# Patient Record
Sex: FEMALE | Race: WHITE | NOT HISPANIC OR LATINO | Employment: FULL TIME | URBAN - METROPOLITAN AREA
[De-identification: names, ages, dates, MRNs, and addresses within clinical notes are randomized per-mention and may not be internally consistent; named-entity substitution may affect disease eponyms.]

---

## 2023-09-12 PROBLEM — M21.619 BUNION: Status: ACTIVE | Noted: 2023-09-12

## 2023-09-12 PROBLEM — M54.2 CERVICAL PAIN (NECK): Status: ACTIVE | Noted: 2023-09-12

## 2023-09-12 PROBLEM — E55.9 VITAMIN D DEFICIENCY: Status: ACTIVE | Noted: 2023-09-12

## 2023-09-12 PROBLEM — E78.5 HYPERLIPIDEMIA: Status: ACTIVE | Noted: 2023-09-12

## 2023-09-12 PROBLEM — N85.2 ENLARGED UTERUS: Status: ACTIVE | Noted: 2023-09-12

## 2023-09-12 PROBLEM — R10.2 PELVIC PAIN IN FEMALE: Status: ACTIVE | Noted: 2023-09-12

## 2023-09-12 PROBLEM — R92.8 ABNORMAL MAMMOGRAM: Status: ACTIVE | Noted: 2023-09-12

## 2023-09-12 RX ORDER — ATORVASTATIN CALCIUM 10 MG/1
1 TABLET, FILM COATED ORAL DAILY
COMMUNITY
Start: 2022-02-25

## 2023-09-12 RX ORDER — CHOLECALCIFEROL (VITAMIN D3) 25 MCG
2 TABLET ORAL DAILY
COMMUNITY
Start: 2020-02-06

## 2023-11-29 ENCOUNTER — LAB REQUISITION (OUTPATIENT)
Dept: LAB | Facility: HOSPITAL | Age: 48
End: 2023-11-29
Payer: COMMERCIAL

## 2023-11-29 DIAGNOSIS — U07.1 COVID-19: ICD-10-CM

## 2023-11-29 LAB — SARS-COV-2 RNA RESP QL NAA+PROBE: DETECTED

## 2023-11-29 PROCEDURE — 87635 SARS-COV-2 COVID-19 AMP PRB: CPT

## 2024-06-18 ENCOUNTER — OFFICE VISIT (OUTPATIENT)
Dept: OBSTETRICS AND GYNECOLOGY | Facility: CLINIC | Age: 49
End: 2024-06-18
Payer: COMMERCIAL

## 2024-06-18 VITALS
HEIGHT: 62 IN | SYSTOLIC BLOOD PRESSURE: 137 MMHG | OXYGEN SATURATION: 96 % | DIASTOLIC BLOOD PRESSURE: 90 MMHG | HEART RATE: 81 BPM | WEIGHT: 143 LBS | BODY MASS INDEX: 26.31 KG/M2

## 2024-06-18 DIAGNOSIS — N63.12 BREAST LUMP ON RIGHT SIDE AT 2 O'CLOCK POSITION: Primary | ICD-10-CM

## 2024-06-18 PROCEDURE — 99213 OFFICE O/P EST LOW 20 MIN: CPT | Performed by: NURSE PRACTITIONER

## 2024-06-18 NOTE — PROGRESS NOTES
HPI: Pete Mayo is a 48 y.o. year old  presenting for right breast mass and pain for two days.  Pt of Dr Trujillo, new to me. Perimenopausal, just finished period. Known enlarged fibroid uterus.  Pt works at pharmacy here at [email or web address]    Last mammogram: 2023 p diagnostic normal  Family history of breast cancer: none   Currently breastfeeding: no  Wearing form-fitting bra: yes  Caffeine intake: coffee 1-2 c per day  New exercise:  no  Injury to area: no   Skin changes/nipple discharge: no     Cycles are monthly, but shorter lasting for 2-3 days   LMP:  LMP 24  Irregular bleeding: no  Last pap:  pap normal, HPV neg  Other gyn history: c/s x2, known fibroid enlarged uterus   OB History    No obstetric history on file.      No past medical history on file.   Past Surgical History:  01/15/2018:  SECTION, CLASSIC      Comment:   Section   Social History    Socioeconomic History      Marital status:       Spouse name: Not on file      Number of children: Not on file      Years of education: Not on file      Highest education level: Not on file    Occupational History      Not on file    Tobacco Use      Smoking status: Not on file      Smokeless tobacco: Not on file    Substance and Sexual Activity      Alcohol use: Not on file      Drug use: Not on file      Sexual activity: Not on file    Other Topics      Concerns:        Not on file    Social History Narrative      Not on file    Social Determinants of Health  Financial Resource Strain: Not on file  Food Insecurity: Not on file  Transportation Needs: Not on file  Physical Activity: Not on file  Stress: Not on file  Social Connections: Not on file  Intimate Partner Violence: Not on file  Housing Stability: Not on file   Review of patient's family history indicates:  Problem: Hypertension      Relation: Mother          Name:               Age of Onset: (Not Specified)  Problem: Hypertension      Relation: Father          Name:         "       Age of Onset: (Not Specified)  Problem: Diabetes      Relation: Other          Name: Grandmother              Age of Onset: (Not Specified)     Current Outpatient Medications:   atorvastatin (Lipitor) 10 mg tablet, Take 1 tablet (10 mg) by mouth once daily., Disp: , Rfl:   cholecalciferol (Vitamin D-3) 25 MCG (1000 UT) tablet, Take 2 tablets (50 mcg) by mouth once daily., Disp: , Rfl:   fish oil concentrate (Omega-3) 120-180 mg capsule, Take 1 capsule (1 g) by mouth once daily., Disp: , Rfl:           REVIEW OF SYSTEMS:  Breast. + right breast mass, denies nipple discharge  : denies dysuria, hematuria incontinence   Gl: denies change in bowel habits, blood in stools      PHYSICAL EXAM:  Vitals: /90   Pulse 81   Ht 1.575 m (5' 2\")   Wt 64.9 kg (143 lb)   SpO2 96%   BMI 26.16 kg/m²   Gen: well appearing woman in NAD  HEENT: normocephalic  Breast: fibrocystic tissue bilaterally with mass noted at 2:00 right breast approx 3cmfn and approx 3.5cm round and mobile, mild tenderness, no skin changes, no lymphadenopathy; left breast with slightly thickened area in same location, but not as enlarged and prominent as right breast.   Abdomen: soft, nontender, no masses/hernias, liver and spleen not enlarged     ASSESSMENT/PLAN :    1. Breast lump on right side at 2 o'clock position  Diagnostic mammogram and US ordered  Just finished menses, has never noticed a mass this big in past  - BI mammo bilateral diagnostic tomosynthesis; Future  - BI US breast limited right; Future   "

## 2024-06-21 ENCOUNTER — HOSPITAL ENCOUNTER (OUTPATIENT)
Dept: RADIOLOGY | Facility: CLINIC | Age: 49
Discharge: HOME | End: 2024-06-21
Payer: COMMERCIAL

## 2024-06-21 VITALS — WEIGHT: 143.08 LBS | BODY MASS INDEX: 26.33 KG/M2 | HEIGHT: 62 IN

## 2024-06-21 DIAGNOSIS — N63.12 BREAST LUMP ON RIGHT SIDE AT 2 O'CLOCK POSITION: ICD-10-CM

## 2024-06-21 PROCEDURE — 76982 USE 1ST TARGET LESION: CPT | Mod: RT

## 2024-06-21 PROCEDURE — 77062 BREAST TOMOSYNTHESIS BI: CPT

## 2024-06-21 PROCEDURE — 76642 ULTRASOUND BREAST LIMITED: CPT | Mod: RT

## 2024-07-15 NOTE — PROGRESS NOTES
"[facility]  Pete Mayo female   1975 49 y.o.  [id number]      Chief Complaint  New patient, biopsy consultation.    History Of Present Illness  Pete Mayo \"Savannah\" is a very pleasant 49 y.o.  female seen in the breast center for biopsy consultation. She denies breast surgery or biopsy. She has no family history of breast cancer.     BREAST IMAGIN2024 Bilateral diagnostic mammogram and right breast ultrasound, BI-RADS Category 4. RIGHT 2:00 5 cm from the nipple, 2.4 x 1.5 x 2.0 cm, oval circumscribed parallel anechoic benign cyst with imperceptible wall, well-defined back wall and associated through transmission, no internal blood flow and soft on elastography. LEFT breast 11:00 6 cm from the nipple, 2.8 x 1.0 x 1.7 cm  (previously 2.2 x 0.6 x 1.0 cm on ultrasound 10/21/2020). interval increase in size of a left breast mass, oval hypoechoic mass with associated through transmission, lacking Doppler blood flow  and with soft elastography features, warranting ultrasound guided biopsy.    REPRODUCTIVE HISTORY: menarche age 13, , first birth age 23,  18 months, no OCP's, perimenopausal, LMP 2024, heterogeneously dense     FAMILY CANCER HISTORY:   None    Review of Systems  Constitutional:  Negative for appetite change, fatigue, fever and unexpected weight change.   HENT:  Negative for ear pain, hearing loss, nosebleeds, sore throat and trouble swallowing.    Eyes:  Negative for discharge, itching and visual disturbance.   Breast: As stated in HPI.  Respiratory:  Negative for cough, chest tightness and shortness of breath.    Cardiovascular:  Negative for chest pain, palpitations and leg swelling.   Gastrointestinal:  Negative for abdominal pain, constipation, diarrhea and nausea.   Endocrine: Negative for cold intolerance and heat intolerance.   Genitourinary:  Negative for dysuria, frequency, hematuria, pelvic pain and vaginal bleeding. "   Musculoskeletal:  Negative for arthralgias, back pain, gait problem, joint swelling and myalgias.   Skin:  Negative for color change and rash.   Allergic/Immunologic: Negative for environmental allergies and food allergies.   Neurological:  Negative for dizziness, tremors, speech difficulty, weakness, numbness and headaches.   Hematological:  Does not bruise/bleed easily.   Psychiatric/Behavioral:  Negative for agitation, dysphoric mood and sleep disturbance. The patient is not nervous/anxious.       Past Medical History  She has no past medical history on file.    Surgical History  She has a past surgical history that includes  section, classic (01/15/2018).    Family History  Cancer-related family history is not on file.     Social History  She has no history on file for tobacco use, alcohol use, and drug use.    Allergies  Patient has no known allergies.    Medications  Current Outpatient Medications   Medication Instructions    atorvastatin (Lipitor) 10 mg tablet 1 tablet, oral, Daily    cholecalciferol (Vitamin D-3) 25 MCG (1000 UT) tablet 2 tablets, oral, Daily    fish oil concentrate (Omega-3) 120-180 mg capsule 1 capsule, oral, Daily       Last Recorded Vitals  Vitals:    24 1012   BP: (!) 131/92   Pulse: 82       Physical Exam  Physical Exam  Chest:           Patient is alert and oriented x3 and in a relaxed and appropriate mood. Her gait is steady and hand grasps are equal. Sclera is clear. The breasts are nearly symmetrical. Right breast 2:00 5 cm from the nipple, 3 x 2.5 cm soft mass, consistent with cyst on imaging. Left breast 11:00 6 cm from the nipple, 3.5 x 2 cm mass like thickening. The skin and nipples appear normal. There is no cervical, supraclavicular or axillary lymphadenopathy. Heart rate and rhythm normal, S1 and S2 appreciated. The lungs are clear to auscultation bilaterally. Abdomen is soft and non-tender.      Relevant Results and Imaging  BI mammo bilateral diagnostic  tomosynthesis 06/21/2024  BI US breast limited right 06/21/2024    Narrative  Interpreted By:  Christine Tony,  STUDY:  BI MAMMO BILATERAL DIAGNOSTIC TOMOSYNTHESIS; BI US BREAST LIMITED  RIGHT;  6/21/2024 2:34 pm; 6/21/2024 3:06 pm    ACCESSION NUMBER(S):  [id number]; [id number]    ORDERING CLINICIAN:  CINDY GALARZA    INDICATION:  Tender palpable lump, 2 o'clock position right breast for 2 days.  Annual exam.    COMPARISON:  Left mammogram 05/17/2023, bilateral mammogram 04/13/2023, 02/18/2020  with additional mammographic and sonographic imaging 10/21/2020    FINDINGS:  MAMMOGRAPHY: 2D and tomosynthesis images were reviewed at 1 mm slice  thickness.    Density:  The breast tissue is heterogeneously dense, which may  obscure small masses.    At the site of palpable concern, medial superior right breast, there  is an oval circumscribed equal density mass superimposed over dense  glandular tissue. Microcalcifications and focal asymmetry in the deep  upper outer left breast remains stable. An equal density mass in the  deep medial central left breast has increased in size compared with  02/18/2020.    ULTRASOUND: Targeted ultrasound was performed of the bilateral  breasts by a registered sonographer with elastography.    Right breast: At the site of palpable concern, 2 o'clock position  right breast 5 cm from the nipple, there is an oval circumscribed  parallel anechoic benign cyst with imperceptible wall, well-defined  back wall and associated through transmission measuring 2.4 x 1.5 x  2.0 cm. There are a few smaller anechoic cysts in the surrounding  tissue. No internal Doppler blood flow and soft elastography features  are demonstrated.    Left breast: Sonographic scanning of the medial superior left breast  confirms interval increase in size of a left breast mass at the 11  o'clock position 6 cm from the nipple. There is an oval hypoechoic  mass with associated through transmission, lacking Doppler blood  flow  and with soft elastography features. This measures 2.8 x 1.0 x 1.7 cm  (previously 2.2 x 0.6 x 1.0 cm on ultrasound 10/21/2020).    Impression  Benign 2.4 cm simple cyst correlating with the site of palpable  tender lump, right breast. No mammographic evidence of malignancy  right breast.    Interval increase in size of an indeterminate left breast mass at the  11 o'clock position 6 cm from the nipple. Consultation in breast  Clinic followed by ultrasound guided breast biopsy is recommended for  definitive diagnosis. This was scheduled prior to the patient leaving  our facility.    The results were discussed with the patient. The ordering provider  was notified via the notification software.    BI-RADS CATEGORY:  BI-RADS Category:  4 Suspicious.  Recommendation:  Surgical Consultation and Biopsy.  Recommended Date:  Immediate.  Laterality:  Bilateral.          I explained the results in depth, along with suggested explanation for follow up recommendations based on the testing results. BI-RADS Category 4    Visit Diagnosis  1. Abnormal finding on breast imaging              Assessment/Plan      Plan:  Proceed to biopsy. A breast radiology physician will perform the biopsy. Results are usually available in about 7-10 business days. I will call patient with results and instruct on next steps and plan.        Patient Discussion/Summary   I recommend a left breast ultrasound guided biopsy. A breast radiology physician will perform the biopsy. Possible diagnoses include benign, atypical, or cancer as we discussed.  Bruising and mild discomfort after the biopsy is normal and will improve. I normally have results in 7-10 business days. I will call you with results, please have your phone handy to take my call.    IMPORTANT INFORMATION REGARDING YOUR RESULT    If you receive medical information from My [facility] Personal Health Record (online chart) your results will be released into your chart. This means you may view  or see results of your biopsy or procedure before I contact you directly. If this occurs, please call the office and we will discuss your results over the phone.    You can see your health information, review clinical summaries from office visits & test results online when you follow your health with MY  Chart, a personal health record. To sign up go to [email or web address]. If you need assistance with signing up or trouble getting into your account call [employer] Patient Line 24/7 at [phone].    My office phone number is [phone] if you need to get in touch with me or have additional questions or concerns. Thank you for choosing [facility] and trusting me as your healthcare provider. I look forward to seeing you again at your next office visit. I am honored to be a provider on your health care team and I remain dedicated to helping you achieve your health goals.Proceed to biopsy. A breast radiology physician will perform the biopsy. Results are usually available in about 7-10 business days. I will call patient with results and instruct on next steps and plan.       Kati Garcia, APRN-CNP

## 2024-07-24 ENCOUNTER — HOSPITAL ENCOUNTER (OUTPATIENT)
Dept: RADIOLOGY | Facility: CLINIC | Age: 49
Discharge: HOME | End: 2024-07-24
Payer: COMMERCIAL

## 2024-07-24 ENCOUNTER — PROCEDURE VISIT (OUTPATIENT)
Dept: SURGICAL ONCOLOGY | Facility: CLINIC | Age: 49
End: 2024-07-24
Payer: COMMERCIAL

## 2024-07-24 VITALS
HEART RATE: 82 BPM | BODY MASS INDEX: 26.28 KG/M2 | WEIGHT: 143.74 LBS | SYSTOLIC BLOOD PRESSURE: 131 MMHG | DIASTOLIC BLOOD PRESSURE: 92 MMHG

## 2024-07-24 DIAGNOSIS — R92.8 ABNORMAL FINDING ON BREAST IMAGING: Primary | ICD-10-CM

## 2024-07-24 DIAGNOSIS — R92.8 OTHER ABNORMAL AND INCONCLUSIVE FINDINGS ON DIAGNOSTIC IMAGING OF BREAST: ICD-10-CM

## 2024-07-24 DIAGNOSIS — N63.20 BREAST MASS, LEFT: ICD-10-CM

## 2024-07-24 PROCEDURE — 2500000005 HC RX 250 GENERAL PHARMACY W/O HCPCS: Performed by: RADIOLOGY

## 2024-07-24 PROCEDURE — 19083 BX BREAST 1ST LESION US IMAG: CPT | Mod: LT

## 2024-07-24 PROCEDURE — 2720000007 HC OR 272 NO HCPCS

## 2024-07-24 PROCEDURE — 99204 OFFICE O/P NEW MOD 45 MIN: CPT

## 2024-07-24 PROCEDURE — 99214 OFFICE O/P EST MOD 30 MIN: CPT

## 2024-07-24 PROCEDURE — 2780000003 HC OR 278 NO HCPCS

## 2024-07-24 PROCEDURE — 19083 BX BREAST 1ST LESION US IMAG: CPT | Mod: LEFT SIDE | Performed by: RADIOLOGY

## 2024-07-24 PROCEDURE — 77065 DX MAMMO INCL CAD UNI: CPT | Mod: LEFT SIDE | Performed by: RADIOLOGY

## 2024-07-24 PROCEDURE — 77065 DX MAMMO INCL CAD UNI: CPT

## 2024-07-24 PROCEDURE — A4648 IMPLANTABLE TISSUE MARKER: HCPCS

## 2024-07-24 ASSESSMENT — PAIN - FUNCTIONAL ASSESSMENT: PAIN_FUNCTIONAL_ASSESSMENT: 0-10

## 2024-07-24 ASSESSMENT — PAIN SCALES - GENERAL
PAINLEVEL_OUTOF10: 0 - NO PAIN
PAINLEVEL_OUTOF10: 0 - NO PAIN
PAINLEVEL: 0-NO PAIN
PAINLEVEL_OUTOF10: 0 - NO PAIN

## 2024-07-24 NOTE — PATIENT INSTRUCTIONS
I recommend a left breast ultrasound guided biopsy. A breast radiology physician will perform the biopsy. Possible diagnoses include benign, atypical, or cancer as we discussed.  Bruising and mild discomfort after the biopsy is normal and will improve. I normally have results in 7-10 business days. I will call you with results, please have your phone handy to take my call.    IMPORTANT INFORMATION REGARDING YOUR RESULT    If you receive medical information from My [facility] Personal Health Record (online chart) your results will be released into your chart. This means you may view or see results of your biopsy or procedure before I contact you directly. If this occurs, please call the office and we will discuss your results over the phone.    You can see your health information, review clinical summaries from office visits & test results online when you follow your health with MY  Chart, a personal health record. To sign up go to [email or web address]. If you need assistance with signing up or trouble getting into your account call [employer] Patient Line 24/7 at [phone].    My office phone number is [phone] if you need to get in touch with me or have additional questions or concerns. Thank you for choosing [facility] and trusting me as your healthcare provider. I look forward to seeing you again at your next office visit. I am honored to be a provider on your health care team and I remain dedicated to helping you achieve your health goals.Proceed to biopsy. A breast radiology physician will perform the biopsy. Results are usually available in about 7-10 business days. I will call patient with results and instruct on next steps and plan.

## 2024-07-24 NOTE — DISCHARGE INSTRUCTIONS
1040:Procedural steps explained and patient given opportunity to verbalize concerns and seek clarification.  Post procedure self-care and potential for bruising , hematoma, and pain reviewed.  Patient verbalizes understanding.      1040:Patient offered aromatherapy, warm blankets and music.   Guided imagery, touch and relaxation breathing to be used throughout the procedure.        1112: Pressure held x 10 minutes, incision dry, steri strips intact and compression dressing applied. Ice pack applied.     1125:    AFTER THE TEST  A steri-strip and bandage will be placed over the incision. You may shower after 24 hours. Remove bandage after 24 hours. Remove bandage after the shower. Leave the steri-strips in place to fall off on their own. If after 1 week the steri-strips are still on, you may remove them. Avoid swimming or soaking in tub for 3 days.     You may have mild discomfort at the test site. If needed, you may take Tylenol (Acetaminophen) for pain. Please avoid taking NSAIDs, Motrin, Advil, Aleve, or ibuprofen for 24 hours following the biopsy. After 24 hours you may resume NSAIDSs.     If you take aspirin, Plavix, Coumadin, Xarelto or Eliquis please tell us. If these medications were stopped by your provider, please ask them when to resume.     You may have some tenderness, bruising or slight bleeding at the site. Please apply ice packs to the site for 15 minutes on and 15 minutes off for a 2 hour minimum.     Most people can return to their usual routine after the procedure. Avoid Strenuous activity for 24 hours.     Sleep in a bra the night after your biopsy. Continue to do so for comfort.     Call your provider if you have any of the following symptoms :  Fever  Increased pain  Increased bleeding  Redness  Increased swelling  Yellowish drainage  Your provider will get the biopsy results within 5 - 7 days. Call your provider with any questions.     Patient education brochure and pain/comfort measures have  been reviewed.   Phone number provided to contact Breast Center if problems arise.     Patient verbalized understanding of home going instructions.     1135:  Patient discharged ambulatory

## 2024-07-29 LAB
LABORATORY COMMENT REPORT: NORMAL
PATH REPORT.FINAL DX SPEC: NORMAL
PATH REPORT.GROSS SPEC: NORMAL
PATH REPORT.TOTAL CANCER: NORMAL

## 2024-07-30 ENCOUNTER — TELEPHONE (OUTPATIENT)
Dept: SURGERY | Facility: HOSPITAL | Age: 49
End: 2024-07-30
Payer: COMMERCIAL

## 2024-07-30 NOTE — TELEPHONE ENCOUNTER
"Result Communication    Resulted Orders   Surgical Pathology Exam   Result Value Ref Range    Case Report       Surgical Pathology                                Case: [id number]                                  Authorizing Provider:  MICHELLE Caputo   Collected:           07/24/2024 1102              Ordering Location:     [facility]       Received:            07/24/2024 1529                                     Center                                                                       Pathologist:           Renée Denton MD                                                            Specimen:    BREAST CORE BIOPSY LEFT, Left Breast 11:00 6CMFN                                           FINAL DIAGNOSIS       A.  LEFT BREAST MASS, 11:00, 6 CM FROM NIPPLE, ULTRASOUND-GUIDED CORE NEEDLE BIOPSY:  --FIBROEPITHELIAL LESION WITH VARIABLE INCREASE OF STROMAL CELLULARITY, SEE NOTE    Note: Left breast mass shows fibroepithelial lesion with increased stromal cellularity and with associated sclerosing adenosis, apocrine metaplasia and cyst and pseudoangiomatous stromal hyperplasia.  The morphologic features favor fibroadenoma.  This case has been reviewed at breast intradepartmental consensus conference via Zoom meeting.              By the signature on this report, the individual or group listed as making the Final Interpretation/Diagnosis certifies that they have reviewed this case.       Gross Description       A: Received in formalin, labeled with the patient's name and hospital number and \"left breast 11:00 6 cm from nipple\", are multiple irregular/cylindrical segments of yellow-white fatty soft tissue aggregating to 2.3 x 1.2 x 0.4 cm.  The specimen is submitted in toto in 3 cassettes.  SMS    NOTE:  Ischemia time: 7/24/2024 1102.  This specimen was placed into formalin at: 7/24/2021 1106.         1:22 PM      Results were successfully communicated with the patient and they acknowledged their " understanding. Awaiting concordance report, patient aware. If concordant, return to PCP for annual mammograms.

## 2024-07-30 NOTE — TELEPHONE ENCOUNTER
"Result Communication    Resulted Orders   Surgical Pathology Exam   Result Value Ref Range    Case Report       Surgical Pathology                                Case: [id number]                                  Authorizing Provider:  MICHELLE Caputo   Collected:           07/24/2024 1102              Ordering Location:     [facility]       Received:            07/24/2024 1529                                     Center                                                                       Pathologist:           Renée Denton MD                                                            Specimen:    BREAST CORE BIOPSY LEFT, Left Breast 11:00 6CMFN                                           FINAL DIAGNOSIS       A.  LEFT BREAST MASS, 11:00, 6 CM FROM NIPPLE, ULTRASOUND-GUIDED CORE NEEDLE BIOPSY:  --FIBROEPITHELIAL LESION WITH VARIABLE INCREASE OF STROMAL CELLULARITY, SEE NOTE    Note: Left breast mass shows fibroepithelial lesion with increased stromal cellularity and with associated sclerosing adenosis, apocrine metaplasia and cyst and pseudoangiomatous stromal hyperplasia.  The morphologic features favor fibroadenoma.  This case has been reviewed at breast intradepartmental consensus conference via Zoom meeting.              By the signature on this report, the individual or group listed as making the Final Interpretation/Diagnosis certifies that they have reviewed this case.       Gross Description       A: Received in formalin, labeled with the patient's name and hospital number and \"left breast 11:00 6 cm from nipple\", are multiple irregular/cylindrical segments of yellow-white fatty soft tissue aggregating to 2.3 x 1.2 x 0.4 cm.  The specimen is submitted in toto in 3 cassettes.  SMS    NOTE:  Ischemia time: 7/24/2024 1102.  This specimen was placed into formalin at: 7/24/2021 1106.         1:19 PM      Results were successfully communicated with the patient and they acknowledged their " understanding.  Awaiting concordance report, patient aware. If concordant, return to PCP for annual mammograms.

## 2024-11-12 ENCOUNTER — APPOINTMENT (OUTPATIENT)
Dept: PRIMARY CARE | Facility: CLINIC | Age: 49
End: 2024-11-12
Payer: COMMERCIAL

## 2024-12-03 ENCOUNTER — APPOINTMENT (OUTPATIENT)
Dept: PRIMARY CARE | Facility: CLINIC | Age: 49
End: 2024-12-03
Payer: COMMERCIAL

## 2024-12-03 VITALS
HEIGHT: 62 IN | DIASTOLIC BLOOD PRESSURE: 76 MMHG | WEIGHT: 148 LBS | BODY MASS INDEX: 27.23 KG/M2 | SYSTOLIC BLOOD PRESSURE: 108 MMHG

## 2024-12-03 DIAGNOSIS — Z12.11 COLON CANCER SCREENING: ICD-10-CM

## 2024-12-03 DIAGNOSIS — M79.642 PAIN IN BOTH HANDS: ICD-10-CM

## 2024-12-03 DIAGNOSIS — M25.562 ACUTE PAIN OF LEFT KNEE: ICD-10-CM

## 2024-12-03 DIAGNOSIS — Z00.00 HEALTHCARE MAINTENANCE: Primary | ICD-10-CM

## 2024-12-03 DIAGNOSIS — M79.641 PAIN IN BOTH HANDS: ICD-10-CM

## 2024-12-03 PROCEDURE — 1036F TOBACCO NON-USER: CPT | Performed by: INTERNAL MEDICINE

## 2024-12-03 PROCEDURE — 3008F BODY MASS INDEX DOCD: CPT | Performed by: INTERNAL MEDICINE

## 2024-12-03 PROCEDURE — 99215 OFFICE O/P EST HI 40 MIN: CPT | Performed by: INTERNAL MEDICINE

## 2024-12-03 RX ORDER — CELECOXIB 100 MG/1
100 CAPSULE ORAL DAILY PRN
Qty: 30 CAPSULE | Refills: 1 | Status: SHIPPED | OUTPATIENT
Start: 2024-12-03 | End: 2025-01-02

## 2024-12-03 NOTE — PROGRESS NOTES
"Subjective   Patient ID: Pete Mayo \"Octaviano" is a 49 y.o. female who presents for Annual Exam.  [facility] hallix valux , Covid November 2021     Patient describes hand pain second third and distal joints for the last 3 months achy grade 6 out of 10 nothing makes better or worse  Left knee arthralgias for the same at a time  Some swelling and nodularity of the hands  Denies trauma paralysis paresthesias joint redness fever chills              Health Maintenance:      Colonoscopy:      Mammogram: 2024      Pelvic/Pap:      Low dose chest CT:      Aorta duplex:      Optho:      Podiatry:        Vaccines:      Refer to Epic Vaccination Log        ROS:      General: denies fever/chills/weight loss      Head: Intermittent occasional headache better at present denies HA/trauma/masses/dizziness      Eyes: denies vision change/loss of vision/blurry vision/diplopia/eye pain      Ears: denies hearing loss/tinnitus/otalgia/otorrhea      Nose: denies nasal drainage/anosmia      Throat: denies dysphagia/odynophagia      Lymphatics: denies lymph node swelling      Breast: denies masses/discharge/dimpling/skin changes      Cardiac: denies CP/palpitations/orthopnea/PND      Pulmonary: denies dyspnea/cough/wheezing      GI: denies abd pain/n/v/diarrhea/melena/hematochezia/hematemesis      : denies dysuria/hematuria/change frequency      Genital: Heavy menses the last 2 weeks denies genital discharge/lesions      Skin: denies rashes/lesions/masses      MSK: Hand pain joint pain second third digit left knee arthralgia with some nodularity swelling denies weakness/swelling/edema/gait imbalance/pain      Neuro: denies paresthesias/seizures/dysarthria      Psych: denies depression/anxiety/suicidal or homicidal ideations            Objective   /76   Ht 1.575 m (5' 2\")   Wt 67.1 kg (148 lb)   BMI 27.07 kg/m²      Physical Exam:     General: AO3, NAD     Head: atraumatic/NC     Eyes: EOMI/PERRLA. Negative APD     Ears: TM " "pearly gray, EAC clear. No lesions or erythema     Nose: symmetric nares, no discharge     Throat: trachea midline, uvula midline pink mucosa. No thyromegaly     Lymphatics: no cervical/supraclavicular/ant or posterior cervical adenopathy/axillary/inguinal adenopathy     Breast: not examined     Chest: no deformity or tenderness to palpation     Pulm: CTA b/l, no wheeze/rhonchi/rales. nonlabored     Cardiac: RRR +s1s2, no m/r/g.      GI: soft, NT/ND. Normoactive Bsx4. No rebound/guarding.     Rectal: not examined     Genital: not examined     MSK: Heberden type nodules second third DIPs bilateral left knee crepitance 5/5 strength UE LE. No edema/clubbing/cyanosis     Skin: no rashes/lesions     Vascular: 2+ palp DP PT radials b/l. Negative carotid bruit     Neuro: CNII-XII intact. No focal deficits. Reflexes 2/4 brachioradialis bicep tricep patellar achilles. Finger to nose intact.     Psych: appropriate mood/affect                    No results found for: \"BMPR1A\", \"CBCDIF\"    Patient deferred rheumatology referral  Assessment/Plan   Diagnoses and all orders for this visit:  Healthcare maintenance  -     CBC and Auto Differential; Future  -     Comprehensive Metabolic Panel; Future  -     Hemoglobin A1C; Future  -     Iron and TIBC; Future  -     Lipid Panel; Future  -     Thyroxine, Free; Future  -     Thyroid Stimulating Hormone; Future  -     PARVEZ with Reflex to MAGGY; Future  -     Rheumatoid Factor; Future  -     Citrulline Antibody, IgG; Future  Colon cancer screening  -     Cologuard® colon cancer screening; Future  Pain in both hands  Comments:  Suspect osteoarthritis less likely inflammatory type such as rheumatoid  Orders:  -     XR hand 1-2 views bilateral; Future  -     celecoxib (CeleBREX) 100 mg capsule; Take 1 capsule (100 mg) by mouth once daily as needed for mild pain (1 - 3).  Acute pain of left knee  Comments:  Suspect osteoarthritis less likely other  Orders:  -     XR knee left 1-2 views; " Future  -     celecoxib (CeleBREX) 100 mg capsule; Take 1 capsule (100 mg) by mouth once daily as needed for mild pain (1 - 3).    Call and follow-up with OB as you stated appointment Friday    Thank you for making appointment today Savannah    Please follow-up 6 months    Bijan Ballard DO, RUSSELL Ballard DO

## 2024-12-04 ENCOUNTER — LAB (OUTPATIENT)
Dept: LAB | Facility: LAB | Age: 49
End: 2024-12-04
Payer: COMMERCIAL

## 2024-12-04 DIAGNOSIS — Z00.00 HEALTHCARE MAINTENANCE: ICD-10-CM

## 2024-12-04 LAB
ALBUMIN SERPL BCP-MCNC: 4.2 G/DL (ref 3.4–5)
ALP SERPL-CCNC: 43 U/L (ref 33–110)
ALT SERPL W P-5'-P-CCNC: 18 U/L (ref 7–45)
ANION GAP SERPL CALC-SCNC: 12 MMOL/L (ref 10–20)
AST SERPL W P-5'-P-CCNC: 21 U/L (ref 9–39)
BASOPHILS # BLD AUTO: 0.05 X10*3/UL (ref 0–0.1)
BASOPHILS NFR BLD AUTO: 0.9 %
BILIRUB SERPL-MCNC: 0.5 MG/DL (ref 0–1.2)
BUN SERPL-MCNC: 17 MG/DL (ref 6–23)
CALCIUM SERPL-MCNC: 8.9 MG/DL (ref 8.6–10.6)
CCP IGG SERPL-ACNC: <1 U/ML
CHLORIDE SERPL-SCNC: 103 MMOL/L (ref 98–107)
CHOLEST SERPL-MCNC: 221 MG/DL (ref 0–199)
CHOLESTEROL/HDL RATIO: 3.4
CO2 SERPL-SCNC: 26 MMOL/L (ref 21–32)
CREAT SERPL-MCNC: 0.75 MG/DL (ref 0.5–1.05)
EGFRCR SERPLBLD CKD-EPI 2021: >90 ML/MIN/1.73M*2
EOSINOPHIL # BLD AUTO: 0.12 X10*3/UL (ref 0–0.7)
EOSINOPHIL NFR BLD AUTO: 2.1 %
ERYTHROCYTE [DISTWIDTH] IN BLOOD BY AUTOMATED COUNT: 13 % (ref 11.5–14.5)
EST. AVERAGE GLUCOSE BLD GHB EST-MCNC: 97 MG/DL
GLUCOSE SERPL-MCNC: 78 MG/DL (ref 74–99)
HBA1C MFR BLD: 5 %
HCT VFR BLD AUTO: 40.8 % (ref 36–46)
HDLC SERPL-MCNC: 65.1 MG/DL
HGB BLD-MCNC: 13.4 G/DL (ref 12–16)
IMM GRANULOCYTES # BLD AUTO: 0.01 X10*3/UL (ref 0–0.7)
IMM GRANULOCYTES NFR BLD AUTO: 0.2 % (ref 0–0.9)
IRON SATN MFR SERPL: 22 % (ref 25–45)
IRON SERPL-MCNC: 98 UG/DL (ref 35–150)
LDLC SERPL CALC-MCNC: 125 MG/DL
LYMPHOCYTES # BLD AUTO: 2.01 X10*3/UL (ref 1.2–4.8)
LYMPHOCYTES NFR BLD AUTO: 35.4 %
MCH RBC QN AUTO: 28.3 PG (ref 26–34)
MCHC RBC AUTO-ENTMCNC: 32.8 G/DL (ref 32–36)
MCV RBC AUTO: 86 FL (ref 80–100)
MONOCYTES # BLD AUTO: 0.39 X10*3/UL (ref 0.1–1)
MONOCYTES NFR BLD AUTO: 6.9 %
NEUTROPHILS # BLD AUTO: 3.1 X10*3/UL (ref 1.2–7.7)
NEUTROPHILS NFR BLD AUTO: 54.5 %
NON HDL CHOLESTEROL: 156 MG/DL (ref 0–149)
NRBC BLD-RTO: 0 /100 WBCS (ref 0–0)
PLATELET # BLD AUTO: 255 X10*3/UL (ref 150–450)
POTASSIUM SERPL-SCNC: 4 MMOL/L (ref 3.5–5.3)
PROT SERPL-MCNC: 6.6 G/DL (ref 6.4–8.2)
RBC # BLD AUTO: 4.73 X10*6/UL (ref 4–5.2)
RHEUMATOID FACT SER NEPH-ACNC: 11 IU/ML (ref 0–15)
SODIUM SERPL-SCNC: 137 MMOL/L (ref 136–145)
T4 FREE SERPL-MCNC: 1.13 NG/DL (ref 0.78–1.48)
TIBC SERPL-MCNC: 451 UG/DL (ref 240–445)
TRIGL SERPL-MCNC: 157 MG/DL (ref 0–149)
TSH SERPL-ACNC: 2.22 MIU/L (ref 0.44–3.98)
UIBC SERPL-MCNC: 353 UG/DL (ref 110–370)
VLDL: 31 MG/DL (ref 0–40)
WBC # BLD AUTO: 5.7 X10*3/UL (ref 4.4–11.3)

## 2024-12-04 PROCEDURE — 80053 COMPREHEN METABOLIC PANEL: CPT

## 2024-12-04 PROCEDURE — 84439 ASSAY OF FREE THYROXINE: CPT

## 2024-12-04 PROCEDURE — 86200 CCP ANTIBODY: CPT

## 2024-12-04 PROCEDURE — 86235 NUCLEAR ANTIGEN ANTIBODY: CPT

## 2024-12-04 PROCEDURE — 80061 LIPID PANEL: CPT

## 2024-12-04 PROCEDURE — 84443 ASSAY THYROID STIM HORMONE: CPT

## 2024-12-04 PROCEDURE — 36415 COLL VENOUS BLD VENIPUNCTURE: CPT

## 2024-12-04 PROCEDURE — 83036 HEMOGLOBIN GLYCOSYLATED A1C: CPT

## 2024-12-04 PROCEDURE — 83550 IRON BINDING TEST: CPT

## 2024-12-04 PROCEDURE — 86225 DNA ANTIBODY NATIVE: CPT

## 2024-12-04 PROCEDURE — 86038 ANTINUCLEAR ANTIBODIES: CPT

## 2024-12-04 PROCEDURE — 83540 ASSAY OF IRON: CPT

## 2024-12-04 PROCEDURE — 86431 RHEUMATOID FACTOR QUANT: CPT

## 2024-12-04 PROCEDURE — 85025 COMPLETE CBC W/AUTO DIFF WBC: CPT

## 2024-12-05 LAB
ANA PATTERN: ABNORMAL
ANA SER QL HEP2 SUBST: POSITIVE
ANA TITR SER IF: ABNORMAL {TITER}
CENTROMERE B AB SER-ACNC: 0.2 AI
CHROMATIN AB SERPL-ACNC: <0.2 AI
DSDNA AB SER-ACNC: <1 IU/ML
ENA JO1 AB SER QL IA: <0.2 AI
ENA RNP AB SER IA-ACNC: <0.2 AI
ENA SCL70 AB SER QL IA: <0.2 AI
ENA SM AB SER IA-ACNC: <0.2 AI
ENA SM+RNP AB SER QL IA: <0.2 AI
ENA SS-A AB SER IA-ACNC: <0.2 AI
ENA SS-B AB SER IA-ACNC: <0.2 AI
RIBOSOMAL P AB SER-ACNC: <0.2 AI

## 2024-12-06 ENCOUNTER — APPOINTMENT (OUTPATIENT)
Dept: OBSTETRICS AND GYNECOLOGY | Facility: CLINIC | Age: 49
End: 2024-12-06
Payer: COMMERCIAL

## 2024-12-06 ENCOUNTER — LAB (OUTPATIENT)
Dept: LAB | Facility: LAB | Age: 49
End: 2024-12-06
Payer: COMMERCIAL

## 2024-12-06 VITALS
BODY MASS INDEX: 26.5 KG/M2 | HEIGHT: 62 IN | SYSTOLIC BLOOD PRESSURE: 118 MMHG | DIASTOLIC BLOOD PRESSURE: 83 MMHG | WEIGHT: 144 LBS

## 2024-12-06 DIAGNOSIS — N92.4 PREMENOPAUSAL MENORRHAGIA: Primary | ICD-10-CM

## 2024-12-06 DIAGNOSIS — D25.9 UTERINE LEIOMYOMA, UNSPECIFIED LOCATION: ICD-10-CM

## 2024-12-06 DIAGNOSIS — N92.4 PREMENOPAUSAL MENORRHAGIA: ICD-10-CM

## 2024-12-06 LAB
B-HCG SERPL-ACNC: <2 MIU/ML
FSH SERPL-ACNC: 5.3 IU/L
PROLACTIN SERPL-MCNC: 4.1 UG/L (ref 3–20)

## 2024-12-06 PROCEDURE — 83001 ASSAY OF GONADOTROPIN (FSH): CPT

## 2024-12-06 PROCEDURE — 84146 ASSAY OF PROLACTIN: CPT

## 2024-12-06 PROCEDURE — 84702 CHORIONIC GONADOTROPIN TEST: CPT

## 2024-12-06 PROCEDURE — 1036F TOBACCO NON-USER: CPT | Performed by: OBSTETRICS & GYNECOLOGY

## 2024-12-06 PROCEDURE — 3008F BODY MASS INDEX DOCD: CPT | Performed by: OBSTETRICS & GYNECOLOGY

## 2024-12-06 PROCEDURE — 36415 COLL VENOUS BLD VENIPUNCTURE: CPT

## 2024-12-06 PROCEDURE — 99213 OFFICE O/P EST LOW 20 MIN: CPT | Performed by: OBSTETRICS & GYNECOLOGY

## 2024-12-06 NOTE — PROGRESS NOTES
"Subjective   Patient ID: Pete Mayo \"Savannah\" is a 49 y.o. female who presents for Menstrual Problem (Patient here for c/o last cycle lasted 2 weeks-very heavy, but no pain/cramping/Contraception-none/Declined chaperone).  HPI  Patient is a 49-year-old  3 para 2 whose had 2  sections.  Her last menstrual period was  she said it came a little bit late and she bled for about 2 weeks prior to that periods were very regular once a month.  That menses was quite heavy as well.  Of note she did see her internist recently has not had normal CBC.  Last normal Pap and HPV 2020 recently had benign breast biopsy.  History of pelvic ultrasound 2023 with 3 uterine fibroids.  Review of Systems    Objective   Physical Exam  Abdomen is soft and nontender without a pedal splenomegaly.  Pelvic: External genitalia normal, vagina normal rugae good tone cervix is clean uterus is mildly enlarged about 10 weeks irregular consistent with fibroids freely mobile nontender.  Assessment/Plan   Perimenopausal bleeding with fibroid uterus.  Will get basic blood work and pelvic ultrasound and proceed accordingly.  Did discuss possible need for endometrial sampling.         Seven Trujillo MD 24 10:39 AM   "

## 2024-12-10 ENCOUNTER — HOSPITAL ENCOUNTER (OUTPATIENT)
Dept: RADIOLOGY | Facility: HOSPITAL | Age: 49
Discharge: HOME | End: 2024-12-10
Payer: COMMERCIAL

## 2024-12-10 DIAGNOSIS — D25.9 UTERINE LEIOMYOMA, UNSPECIFIED LOCATION: ICD-10-CM

## 2024-12-10 DIAGNOSIS — N92.4 PREMENOPAUSAL MENORRHAGIA: ICD-10-CM

## 2024-12-16 ENCOUNTER — HOSPITAL ENCOUNTER (OUTPATIENT)
Dept: RADIOLOGY | Facility: HOSPITAL | Age: 49
Discharge: HOME | End: 2024-12-16
Payer: COMMERCIAL

## 2024-12-16 PROCEDURE — 76856 US EXAM PELVIC COMPLETE: CPT | Performed by: RADIOLOGY

## 2024-12-16 PROCEDURE — 76830 TRANSVAGINAL US NON-OB: CPT | Performed by: RADIOLOGY

## 2024-12-16 PROCEDURE — 76830 TRANSVAGINAL US NON-OB: CPT

## 2024-12-20 DIAGNOSIS — R76.8 ANA POSITIVE: ICD-10-CM

## 2024-12-20 DIAGNOSIS — E78.2 COMBINED HYPERLIPIDEMIA: Primary | ICD-10-CM

## 2024-12-20 RX ORDER — ATORVASTATIN CALCIUM 20 MG/1
20 TABLET, FILM COATED ORAL DAILY
Qty: 90 TABLET | Refills: 1 | Status: SHIPPED | OUTPATIENT
Start: 2024-12-20 | End: 2025-03-20

## 2024-12-23 ENCOUNTER — TELEPHONE (OUTPATIENT)
Dept: PRIMARY CARE | Facility: CLINIC | Age: 49
End: 2024-12-23
Payer: COMMERCIAL

## 2024-12-23 NOTE — TELEPHONE ENCOUNTER
----- Message from Bijan Ballard sent at 12/20/2024  5:35 PM EST -----  Veronique-please notify patient she has a positive PARVEZ autoimmune level given her joint pain she should follow-up with rheumatology for further evaluation of this.  High cholesterol uncontrolled at 221 goal is less than 200 start atorvastatin 20 mg a day work on a low-cholesterol diet increase exercise  Otherwise currently available blood work is stable normal    Merlyn-please register patient for MyChart    If any further questions, or would like an in office result review please schedule follow-up to be seen in the next 2 to 4 weeks thank you

## 2024-12-28 LAB — NONINV COLON CA DNA+OCC BLD SCRN STL QL: NEGATIVE

## 2025-01-04 ENCOUNTER — TELEPHONE (OUTPATIENT)
Dept: OBSTETRICS AND GYNECOLOGY | Facility: CLINIC | Age: 50
End: 2025-01-04
Payer: COMMERCIAL

## 2025-01-04 NOTE — TELEPHONE ENCOUNTER
Left message on patients voice mail that I called to discuss results. Pt does not have mychart set up. Told her to set up mychart or call office with best time to call her. Have called several times.

## 2025-01-06 ENCOUNTER — OFFICE VISIT (OUTPATIENT)
Dept: PRIMARY CARE | Facility: CLINIC | Age: 50
End: 2025-01-06
Payer: COMMERCIAL

## 2025-01-06 VITALS
WEIGHT: 150 LBS | OXYGEN SATURATION: 96 % | SYSTOLIC BLOOD PRESSURE: 145 MMHG | DIASTOLIC BLOOD PRESSURE: 92 MMHG | BODY MASS INDEX: 27.44 KG/M2 | HEART RATE: 82 BPM

## 2025-01-06 DIAGNOSIS — E78.5 HYPERLIPIDEMIA, UNSPECIFIED HYPERLIPIDEMIA TYPE: Primary | ICD-10-CM

## 2025-01-06 DIAGNOSIS — J06.9 VIRAL UPPER RESPIRATORY TRACT INFECTION: ICD-10-CM

## 2025-01-06 PROBLEM — R05.3 CHRONIC COUGH: Status: ACTIVE | Noted: 2025-01-06

## 2025-01-06 PROCEDURE — 99213 OFFICE O/P EST LOW 20 MIN: CPT | Performed by: STUDENT IN AN ORGANIZED HEALTH CARE EDUCATION/TRAINING PROGRAM

## 2025-01-06 PROCEDURE — 1036F TOBACCO NON-USER: CPT | Performed by: STUDENT IN AN ORGANIZED HEALTH CARE EDUCATION/TRAINING PROGRAM

## 2025-01-06 RX ORDER — BENZONATATE 200 MG/1
200 CAPSULE ORAL 3 TIMES DAILY PRN
Qty: 42 CAPSULE | Refills: 0 | Status: SHIPPED | OUTPATIENT
Start: 2025-01-06 | End: 2025-02-05

## 2025-01-06 RX ORDER — AZITHROMYCIN 250 MG/1
TABLET, FILM COATED ORAL
Qty: 6 TABLET | Refills: 0 | Status: SHIPPED | OUTPATIENT
Start: 2025-01-06 | End: 2025-01-11

## 2025-01-06 ASSESSMENT — ENCOUNTER SYMPTOMS
FATIGUE: 1
MUSCULOSKELETAL NEGATIVE: 1
GASTROINTESTINAL NEGATIVE: 1
CARDIOVASCULAR NEGATIVE: 1
NEUROLOGICAL NEGATIVE: 1
PSYCHIATRIC NEGATIVE: 1
COUGH: 1
STRIDOR: 1

## 2025-01-06 NOTE — PROGRESS NOTES
Subjective   Patient ID: Savannah Mayo is a 49 y.o. female.    Patient seen on sick visit.  Work notes that she is overall doing well however recently developed a cough.  States that is nonproductive in nature however persistent.  Otherwise, states no fever chills nausea vomiting.  Symptoms have been going on greater than 6 days.  Otherwise states no additional issues.        Review of Systems   Constitutional:  Positive for fatigue.   Respiratory:  Positive for cough and stridor.    Cardiovascular: Negative.    Gastrointestinal: Negative.    Musculoskeletal: Negative.    Neurological: Negative.    Psychiatric/Behavioral: Negative.         Objective Visit Vitals  BP (!) 145/92   Pulse 82   Wt 68 kg (150 lb)   SpO2 96%   BMI 27.44 kg/m²   OB Status Having periods   Smoking Status Never   BSA 1.72 m²      Physical Exam  Constitutional:       General: She is not in acute distress.     Appearance: She is not ill-appearing.   Eyes:      Pupils: Pupils are equal, round, and reactive to light.   Cardiovascular:      Rate and Rhythm: Normal rate and regular rhythm.      Pulses: Normal pulses.      Heart sounds: No murmur heard.  Pulmonary:      Effort: No respiratory distress.      Breath sounds: No wheezing.      Comments: No wheezing noted  Abdominal:      General: Abdomen is flat. Bowel sounds are normal. There is no distension.   Musculoskeletal:      Right lower leg: No edema.      Left lower leg: No edema.   Skin:     General: Skin is warm and dry.   Neurological:      Mental Status: She is alert. Mental status is at baseline.      Cranial Nerves: No cranial nerve deficit.      Motor: No weakness.   Psychiatric:         Mood and Affect: Mood normal.         Behavior: Behavior normal.         Assessment/Plan   Diagnoses and all orders for this visit:  Hyperlipidemia, unspecified hyperlipidemia type  Viral upper respiratory tract infection  -     azithromycin (Zithromax) 250 mg tablet; Take 2 tablets (500 mg) by mouth  once daily for 1 day, THEN 1 tablet (250 mg) once daily for 4 days. Take 2 tabs (500 mg) by mouth today, than 1 daily for 4 days..  -     benzonatate (Tessalon) 200 mg capsule; Take 1 capsule (200 mg) by mouth 3 times a day as needed for cough. Do not crush or chew.  Patient seen on sick visit    #URI  Suspect postviral syndrome recommend Z-Dontae as well as Tessalon Perles and over-the-counter medications that he honey, continue supportive care patient agreeable with this plan.  She will call if symptoms worsen or do not improve    Return to care as previous scheduled or as needed

## 2025-01-14 ENCOUNTER — APPOINTMENT (OUTPATIENT)
Dept: OBSTETRICS AND GYNECOLOGY | Facility: CLINIC | Age: 50
End: 2025-01-14
Payer: COMMERCIAL

## 2025-01-17 ENCOUNTER — TELEPHONE (OUTPATIENT)
Dept: PRIMARY CARE | Facility: CLINIC | Age: 50
End: 2025-01-17
Payer: COMMERCIAL

## 2025-01-17 NOTE — TELEPHONE ENCOUNTER
----- Message from Bijan Ballard sent at 1/13/2025 12:18 PM EST -----  Veronique-please notify patient Cologuard test normal    Merlyn-please register patient for MyChart    If any further questions, or would like an in office result review please schedule follow-up to be seen in the next 2 to 4 weeks thank you

## 2025-01-21 ENCOUNTER — APPOINTMENT (OUTPATIENT)
Dept: OBSTETRICS AND GYNECOLOGY | Facility: CLINIC | Age: 50
End: 2025-01-21
Payer: COMMERCIAL

## 2025-01-21 VITALS
SYSTOLIC BLOOD PRESSURE: 118 MMHG | BODY MASS INDEX: 26.41 KG/M2 | DIASTOLIC BLOOD PRESSURE: 80 MMHG | HEIGHT: 62 IN | WEIGHT: 143.5 LBS

## 2025-01-21 DIAGNOSIS — N93.9 ABNORMAL UTERINE BLEEDING (AUB): Primary | ICD-10-CM

## 2025-01-21 PROCEDURE — 99212 OFFICE O/P EST SF 10 MIN: CPT | Performed by: OBSTETRICS & GYNECOLOGY

## 2025-01-21 NOTE — PROGRESS NOTES
"Subjective   Patient ID: Pete Mayo \"Savannah\" is a 49 y.o. female who presents for Procedure (Patient here for emb for fibroids and menorrhagia/Pt is bleeding today/Declined chaperone).  HPI  Patient is a 49-year-old  3 para 2 whose had 2  sections.  Patient states she has been bleeding continuously every day.  Like since 2024.  Had recent ultrasound which revealed 3 uterine fibroids and endometrial thickness of 1.3 cm.  Patient and I discussed endometrial biopsy today.  Patient unable to give urine sample and would like to have biopsy another day.  Review of Systems  Deferred  Objective   Physical Exam    Assessment/Plan   Abnormal uterine bleeding, fibroid uterus with increased endometrial thickness, perimenopause.  Patient will reschedule endometrial biopsy         Seven Trujillo MD 25 1:30 PM   "

## 2025-01-27 ENCOUNTER — APPOINTMENT (OUTPATIENT)
Dept: OBSTETRICS AND GYNECOLOGY | Facility: CLINIC | Age: 50
End: 2025-01-27
Payer: COMMERCIAL

## 2025-01-27 VITALS
SYSTOLIC BLOOD PRESSURE: 128 MMHG | DIASTOLIC BLOOD PRESSURE: 87 MMHG | WEIGHT: 144.9 LBS | HEIGHT: 62 IN | BODY MASS INDEX: 26.67 KG/M2

## 2025-01-27 DIAGNOSIS — N93.9 ABNORMAL UTERINE BLEEDING: ICD-10-CM

## 2025-01-27 DIAGNOSIS — D25.9 UTERINE LEIOMYOMA, UNSPECIFIED LOCATION: Primary | ICD-10-CM

## 2025-01-27 LAB — PREGNANCY TEST URINE, POC: NEGATIVE

## 2025-01-27 PROCEDURE — 81025 URINE PREGNANCY TEST: CPT | Performed by: OBSTETRICS & GYNECOLOGY

## 2025-01-27 PROCEDURE — 58100 BIOPSY OF UTERUS LINING: CPT | Performed by: OBSTETRICS & GYNECOLOGY

## 2025-01-27 NOTE — PROGRESS NOTES
"Patient ID: Pete Mayo \"Octaviano" is a 49 y.o. female.    Endometrial biopsy    Date/Time: 1/27/2025 3:50 PM    Performed by: Seven Trujillo MD  Authorized by: Seven Trujillo MD    Consent:     Consent obtained: written    Consent given by: patient    Risks discussed: bleeding, infection and pain    Alternatives discussed: alternative treatment    Patient agrees, verbalizes understanding, and wants to proceed: yes      Procedure explained and questions answered to patient or proxy's satisfaction: yes    Indications:     Indications: abnormal uterine bleeding    Pre-procedure:     Urine pregnancy test: negative    Procedure:     A bimanual exam was performed: yes      Prepped with: Betadine    Tenaculum used: no      Cervix dilated: no      Number of passes: 0  Comments:     Procedure comments: Speculum was placed in the vagina.  It was very difficult to see the cervical os due to irregularity from fibroids.  It appeared as though there may have been a polyp protruding or fibroid protruding from cervical os.  After several attempts difficult to see the actual os.  At this point speculum was removed.  Explained to patient that I was unable to do endometrial biopsy today.  Recommend that we do procedure as outpatient hysteroscopy D&C and MyoSure.  It would be much easier to manipulate cervix and retract down when patient is asleep.  She agrees.  Will schedule surgery and proceed accordingly.    "

## 2025-01-30 ENCOUNTER — PREP FOR PROCEDURE (OUTPATIENT)
Dept: OBSTETRICS AND GYNECOLOGY | Facility: CLINIC | Age: 50
End: 2025-01-30
Payer: COMMERCIAL

## 2025-01-30 DIAGNOSIS — N93.9 ABNORMAL UTERINE BLEEDING: Primary | ICD-10-CM

## 2025-02-11 ENCOUNTER — PRE-ADMISSION TESTING (OUTPATIENT)
Dept: PREADMISSION TESTING | Facility: HOSPITAL | Age: 50
End: 2025-02-11
Payer: COMMERCIAL

## 2025-02-11 VITALS
OXYGEN SATURATION: 99 % | TEMPERATURE: 97 F | BODY MASS INDEX: 26.78 KG/M2 | HEART RATE: 78 BPM | DIASTOLIC BLOOD PRESSURE: 80 MMHG | SYSTOLIC BLOOD PRESSURE: 128 MMHG | RESPIRATION RATE: 16 BRPM | HEIGHT: 62 IN | WEIGHT: 145.5 LBS

## 2025-02-11 DIAGNOSIS — N93.9 ABNORMAL UTERINE BLEEDING (AUB): ICD-10-CM

## 2025-02-11 DIAGNOSIS — Z01.818 ENCOUNTER FOR PREADMISSION TESTING: Primary | ICD-10-CM

## 2025-02-11 DIAGNOSIS — E78.49 OTHER HYPERLIPIDEMIA: ICD-10-CM

## 2025-02-11 PROCEDURE — 99203 OFFICE O/P NEW LOW 30 MIN: CPT | Performed by: NURSE PRACTITIONER

## 2025-02-11 ASSESSMENT — DUKE ACTIVITY SCORE INDEX (DASI)
TOTAL_SCORE: 46.2
CAN YOU DO LIGHT WORK AROUND THE HOUSE LIKE DUSTING OR WASHING DISHES: YES
CAN YOU WALK A BLOCK OR TWO ON LEVEL GROUND: YES
CAN YOU TAKE CARE OF YOURSELF (EAT, DRESS, BATHE, OR USE TOILET): YES
DASI METS SCORE: 8.4
CAN YOU WALK INDOORS, SUCH AS AROUND YOUR HOUSE: YES
CAN YOU DO MODERATE WORK AROUND THE HOUSE LIKE VACUUMING, SWEEPING FLOORS OR CARRYING GROCERIES: YES
CAN YOU RUN A SHORT DISTANCE: YES
CAN YOU DO YARD WORK LIKE RAKING LEAVES, WEEDING OR PUSHING A MOWER: NO
CAN YOU DO HEAVY WORK AROUND THE HOUSE LIKE SCRUBBING FLOORS OR LIFTING AND MOVING HEAVY FURNITURE: YES
CAN YOU PARTICIPATE IN MODERATE RECREATIONAL ACTIVITIES LIKE GOLF, BOWLING, DANCING, DOUBLES TENNIS OR THROWING A BASEBALL OR FOOTBALL: YES
CAN YOU CLIMB A FLIGHT OF STAIRS OR WALK UP A HILL: YES
CAN YOU PARTICIPATE IN STRENOUS SPORTS LIKE SWIMMING, SINGLES TENNIS, FOOTBALL, BASKETBALL, OR SKIING: NO
CAN YOU HAVE SEXUAL RELATIONS: YES

## 2025-02-11 NOTE — H&P (VIEW-ONLY)
"CPM/PAT Evaluation       Name: Pete Mayo (Pete Mayo \"Savannah\")  /Age: 1975/49 y.o.     In-Person       Chief Complaint: PAT for planned GYN procedure    49 yr old female w/PHx of HLD, arthritis and currently abnormal uterine bleeding referred to PAT for planned Hysteroscopy/D&C w/myosure w/Dr Trujillo on 2025      Patient reports recent URI treated w/antibiotics, adding has since recovered well with symptoms resolved.  States since has bee feeling overall well, denies fever or cough. Reports remaining otherwise physically active, walking routinely; denies cardiac or respiratory symptoms. Past surgical hx includes  w/nausea afterwards; no previous general anesthesia.      Followed by PCP (Bijan Ballard DO (PCP) and recent visit w/Luis Carlos Beatty DO (PCP) on 2025 for suspected URI and was treated w/Z-jose g        Past Medical History:   Diagnosis Date    Dysfunctional uterine bleeding     Fibroid     PONV (postoperative nausea and vomiting)     Vision loss        Past Surgical History:   Procedure Laterality Date     SECTION, CLASSIC  01/15/2018     Section       Patient Sexual activity questions deferred to the physician.    Family History   Problem Relation Name Age of Onset    Hypertension Mother      Fibroids Mother      Hypertension Father      Diabetes Other Grandmother        No Known Allergies    Prior to Admission medications    Medication Sig Start Date End Date Taking? Authorizing Provider   cholecalciferol (Vitamin D-3) 25 MCG (1000 UT) tablet Take 2 tablets (50 mcg) by mouth once daily. 20  Yes Historical Provider, MD   fish oil concentrate (Omega-3) 120-180 mg capsule Take 1 capsule (1 g) by mouth once daily. 20  Yes Historical Provider, MD   atorvastatin (Lipitor) 20 mg tablet Take 1 tablet (20 mg) by mouth once daily.  Patient not taking: Reported on 2025 12/20/24 3/20/25  Bijan Ballard DO   benzonatate (Tessalon) 200 mg capsule Take 1 " "capsule (200 mg) by mouth 3 times a day as needed for cough. Do not crush or chew. 1/6/25 2/5/25  Luis Carlos Beatty,         Review of Systems    Constitutional: no fever, no chills and not feeling poorly.   Eyes: no eyesight problems.   ENT: no hearing loss, no nosebleeds and no sore throat.   Cardiovascular: no chest pain, no palpitations and no extremity edema.   Respiratory: no sob, no wheezing, no cough and no sob w/exertion.   Gastrointestinal: negative for abdominal pain, blood in stools or changes in bowel habits   Genitourinary: negative for dysuria, incontinence or changes in urinary habits   Musculoskeletal: no arthralgias, ambulates independently.   Integumentary: negative for lesions, rash or itching.   Neurological: negative for confusion, dizziness or fainting.   Psychiatric: not suicidal, no anxiety and no depression.   All other systems have been reviewed and are negative for complaint.     Physical Exam  Vitals reviewed.   Constitutional:       Appearance: Normal appearance.   HENT:      Head: Normocephalic.      Mouth/Throat:      Mouth: Mucous membranes are moist.   Eyes:      Pupils: Pupils are equal, round, and reactive to light.   Cardiovascular:      Rate and Rhythm: Normal rate and regular rhythm.   Pulmonary:      Effort: Pulmonary effort is normal.      Breath sounds: Normal breath sounds.   Abdominal:      General: Bowel sounds are normal.   Musculoskeletal:         General: Normal range of motion.   Skin:     General: Skin is warm and dry.   Neurological:      Mental Status: She is alert and oriented to person, place, and time.   Psychiatric:         Mood and Affect: Mood normal.          PAT AIRWAY:   Airway:     Mallampati::  I    TM distance::  >3 FB    Neck ROM::  Full  normal        Testing/Diagnostic: CBC, BMP, thyroid    Patient Specialist/PCP: Bijan Ballard DO (PCP) 12/3/2024    Visit Vitals  /80   Pulse 78   Temp 36.1 °C (97 °F) (Temporal)   Resp 16   Ht 1.575 m (5' 2\") "   Wt 66 kg (145 lb 8.1 oz)   LMP 01/21/2025   SpO2 99%   BMI 26.61 kg/m²   OB Status Having periods   Smoking Status Never   BSA 1.7 m²       DASI Risk Score      Flowsheet Row Pre-Admission Testing [facility]/11/2025 in [facility]   Can you take care of yourself (eat, dress, bathe, or use toilet)?  2.75 filed at 02/11/2025 1130   Can you walk indoors, such as around your house? 1.75 filed at 02/11/2025 1130   Can you walk a block or two on level ground?  2.75 filed at 02/11/2025 1130   Can you climb a flight of stairs or walk up a hill? 5.5 filed at 02/11/2025 1130   Can you run a short distance? 8 filed at 02/11/2025 1130   Can you do light work around the house like dusting or washing dishes? 2.7 filed at 02/11/2025 1130   Can you do moderate work around the house like vacuuming, sweeping floors or carrying groceries? 3.5 filed at 02/11/2025 1130   Can you do heavy work around the house like scrubbing floors or lifting and moving heavy furniture?  8 filed at 02/11/2025 1130   Can you do yard work like raking leaves, weeding or pushing a mower? 0 filed at 02/11/2025 1130   Can you have sexual relations? 5.25 filed at 02/11/2025 1130   Can you participate in moderate recreational activities like golf, bowling, dancing, doubles tennis or throwing a baseball or football? 6 filed at 02/11/2025 1130   Can you participate in strenous sports like swimming, singles tennis, football, basketball, or skiing? 0 filed at 02/11/2025 1130   DASI SCORE 46.2 filed at 02/11/2025 1130   METS Score (Will be calculated only when all the questions are answered) 8.4 filed at 02/11/2025 1130          Caprini DVT Assessment    No data to display       Modified Frailty Index    No data to display       CHADS2 Stroke Risk  Current as of 55 minutes ago        N/A 3 to 100%: High Risk   2 to < 3%: Medium Risk   0 to < 2%: Low Risk     Last Change: N/A          This score determines the patient's risk of having a stroke if the patient  has atrial fibrillation.        This score is not applicable to this patient. Components are not calculated.          Revised Cardiac Risk Index    No data to display       Apfel Simplified Score    No data to display       Risk Analysis Index Results This Encounter    No data found in the last 10 encounters.       Stop Bang Score      Flowsheet Row Pre-Admission Testing from 2/11/2025 in [facility]   Do you snore loudly? 0 filed at 02/11/2025 1129   Do you often feel tired or fatigued after your sleep? 0 filed at 02/11/2025 1129   Has anyone ever observed you stop breathing in your sleep? 0 filed at 02/11/2025 1129   Do you have or are you being treated for high blood pressure? 0 filed at 02/11/2025 1129   Recent BMI (Calculated) 26.6 filed at 02/11/2025 1129   Is BMI greater than 35 kg/m2? 0=No filed at 02/11/2025 1129   Age older than 50 years old? 0=No filed at 02/11/2025 1129   Is your neck circumference greater than 17 inches (Male) or 16 inches (Female)? 0 filed at 02/11/2025 1129   Gender - Male 0=No filed at 02/11/2025 1129   STOP-BANG Total Score 0 filed at 02/11/2025 1129          Prodigy: High Risk  Total Score: 0          ARISCAT Score for Postoperative Pulmonary Complications      Flowsheet Row Pre-Admission Testing from 2/11/2025 in [facility]   Age Calculated Score 3 filed at 02/11/2025 1328   Preoperative SpO2 0 filed at 02/11/2025 1328   Respiratory infection in the last month Either upper or lower (i.e., URI, bronchitis, pneumonia), with fever and antibiotic treatment 17 filed at 02/11/2025 1328   Preoperative anemia (Hgb less than 10 g/dl) 0 filed at 02/11/2025 1328   Surgical incision  0  [GYN] filed at 02/11/2025 1328   Duration of surgery  0 filed at 02/11/2025 1328   Emergency Procedure  0 filed at 02/11/2025 1328   ARISCAT Total Score  20 filed at 02/11/2025 1328          Loc Perioperative Risk for Myocardial Infarction or Cardiac Arrest (RASHIDA)      Flowsheet Row  Pre-Admission Testing [facility]/11/2025 in [facility]   Calculated Age Score 1 filed at 02/11/2025 1328   Functional Status  0 filed at 02/11/2025 1328   ASA Class  -3.29 filed at 02/11/2025 1328   Creatinine 0 filed at 02/11/2025 1328   Type of Procedure  0.76 filed at 02/11/2025 1328   RASHIDA Total Score  -6.78 filed at 02/11/2025 1328   RASHIDA % 0.11 filed at 02/11/2025 1328            Assessment and Plan:     # HLD - continue atorvastatin  # RA - suspected per PCP note; patient declined RA referral; followed by PCP  # Abnormal uterine bleeding - Hysteroscopy/D&C w/myosure w/Dr Trujillo on 2/17/2025    Medical hx, Allergies, VS and Labs reviewed  Medications addressed w/pre-op instructions provided

## 2025-02-11 NOTE — PREPROCEDURE INSTRUCTIONS
Medication List            Accurate as of February 11, 2025 11:50 AM. Always use your most recent med list.                atorvastatin 20 mg tablet  Commonly known as: Lipitor  Take 1 tablet (20 mg) by mouth once daily.  Medication Adjustments for Surgery: Take/Use as prescribed     cholecalciferol 25 MCG (1000 UT) tablet  Commonly known as: Vitamin D-3  Additional Medication Adjustments for Surgery: Take last dose 7 days before surgery     fish oil concentrate 120-180 mg capsule  Commonly known as: Omega-3  Additional Medication Adjustments for Surgery: Take last dose 7 days before surgery            PRE-OPERATIVE INSTRUCTIONS FOR SURGERY    *Do not eat anything after midnight the night of surgery.  This includes food of any kind (including hard candy, cough drops, mints).   You may have up to 13 ounces of clear liquid  until TWO hours prior to your scheduled surgery time,  Clear liquids include water, black tea/coffee, (no milk or cream) apple juice and electrolyte drinks (GATORADE).  You may chew gum until TWO hours prior you your surgery/procedure.           *One of our staff members will call you ONE business day before your surgery, between 11am-2 pm to let you know the time to arrive.    If you have not received a call by 2 pm, call [phone]    *When you arrive at the hospital-->GO TO Registration on the ground floor    *Stop smoking 24 hours prior to surgery.      No Marijuana, CBD Oil or Vaping for 48 hours    *No alcohol 24 hours prior to surgery    *You will need a responsible adult to drive you home    -No acrylic nails or nail polish on at least one fingernail, NO polish on toes for foot surgery    -You may be asked to remove your dentures, partial plate, eyeglasses or contact lenses before going to surgery.  Please bring a case for these items.    Please shower the morning of surgery so as to remove any body residue from applied products.  DO NOT REAPPLY any lotions, creams, powders, perfume,  makeup or deodorant to your body after showering.    -Body piercings need to be removed.  Jewelry and valuables should be left at home.    -Put on loose,  comfortable, clean clothing, that will accommodate bandages      What you may be asked to bring to surgery:    ___PHOTO ID and insurance information    ___Urine specimen            NPO Instructions:    Do not eat any food after midnight the night before your surgery/procedure.  You may have clear liquids until TWO hours before surgery/procedure. This includes water, black tea/coffee, (no milk or cream) apple juice and electrolyte drinks (Gatorade).  You may chew gum up to TWO hours before your surgery/procedure.      Additional Instructions:       Day of Surgery:  You may have clear liquids until TWO hours before surgery/procedure.  This includes water, black tea/coffee, (no milk or cream) apple juice and electrolyte drinks (Gatorade)  You may chew gum up to TWO hours before your surgery/procedure  Wear  comfortable loose fitting clothing  Do not use moisturizers, creams, lotions or perfume  All jewelry and valuables should be left at home

## 2025-02-11 NOTE — CPM/PAT H&P
"CPM/PAT Evaluation       Name: Pete Mayo (Pete Mayo \"Savannah\")  /Age: 1975/49 y.o.     In-Person       Chief Complaint: PAT for planned GYN procedure    49 yr old female w/PHx of HLD, arthritis and currently abnormal uterine bleeding referred to PAT for planned Hysteroscopy/D&C w/myosure w/Dr Trujillo on 2025      Patient reports recent URI treated w/antibiotics, adding has since recovered well with symptoms resolved.  States since has bee feeling overall well, denies fever or cough. Reports remaining otherwise physically active, walking routinely; denies cardiac or respiratory symptoms. Past surgical hx includes  w/nausea afterwards; no previous general anesthesia.      Followed by PCP (Bijan Ballard DO (PCP) and recent visit w/Luis Carlos Beatty DO (PCP) on 2025 for suspected URI and was treated w/Z-jose g        Past Medical History:   Diagnosis Date    Dysfunctional uterine bleeding     Fibroid     PONV (postoperative nausea and vomiting)     Vision loss        Past Surgical History:   Procedure Laterality Date     SECTION, CLASSIC  01/15/2018     Section       Patient Sexual activity questions deferred to the physician.    Family History   Problem Relation Name Age of Onset    Hypertension Mother      Fibroids Mother      Hypertension Father      Diabetes Other Grandmother        No Known Allergies    Prior to Admission medications    Medication Sig Start Date End Date Taking? Authorizing Provider   cholecalciferol (Vitamin D-3) 25 MCG (1000 UT) tablet Take 2 tablets (50 mcg) by mouth once daily. 20  Yes Historical Provider, MD   fish oil concentrate (Omega-3) 120-180 mg capsule Take 1 capsule (1 g) by mouth once daily. 20  Yes Historical Provider, MD   atorvastatin (Lipitor) 20 mg tablet Take 1 tablet (20 mg) by mouth once daily.  Patient not taking: Reported on 2025 12/20/24 3/20/25  Bijan Ballard DO   benzonatate (Tessalon) 200 mg capsule Take 1 " "capsule (200 mg) by mouth 3 times a day as needed for cough. Do not crush or chew. 1/6/25 2/5/25  Luis Carlos Beatty,         Review of Systems    Constitutional: no fever, no chills and not feeling poorly.   Eyes: no eyesight problems.   ENT: no hearing loss, no nosebleeds and no sore throat.   Cardiovascular: no chest pain, no palpitations and no extremity edema.   Respiratory: no sob, no wheezing, no cough and no sob w/exertion.   Gastrointestinal: negative for abdominal pain, blood in stools or changes in bowel habits   Genitourinary: negative for dysuria, incontinence or changes in urinary habits   Musculoskeletal: no arthralgias, ambulates independently.   Integumentary: negative for lesions, rash or itching.   Neurological: negative for confusion, dizziness or fainting.   Psychiatric: not suicidal, no anxiety and no depression.   All other systems have been reviewed and are negative for complaint.     Physical Exam  Vitals reviewed.   Constitutional:       Appearance: Normal appearance.   HENT:      Head: Normocephalic.      Mouth/Throat:      Mouth: Mucous membranes are moist.   Eyes:      Pupils: Pupils are equal, round, and reactive to light.   Cardiovascular:      Rate and Rhythm: Normal rate and regular rhythm.   Pulmonary:      Effort: Pulmonary effort is normal.      Breath sounds: Normal breath sounds.   Abdominal:      General: Bowel sounds are normal.   Musculoskeletal:         General: Normal range of motion.   Skin:     General: Skin is warm and dry.   Neurological:      Mental Status: She is alert and oriented to person, place, and time.   Psychiatric:         Mood and Affect: Mood normal.          PAT AIRWAY:   Airway:     Mallampati::  I    TM distance::  >3 FB    Neck ROM::  Full  normal        Testing/Diagnostic: CBC, BMP, thyroid    Patient Specialist/PCP: Bijan Ballard DO (PCP) 12/3/2024    Visit Vitals  /80   Pulse 78   Temp 36.1 °C (97 °F) (Temporal)   Resp 16   Ht 1.575 m (5' 2\") "   Wt 66 kg (145 lb 8.1 oz)   LMP 01/21/2025   SpO2 99%   BMI 26.61 kg/m²   OB Status Having periods   Smoking Status Never   BSA 1.7 m²       DASI Risk Score      Flowsheet Row Pre-Admission Testing from 2/11/2025 in [facility]   Can you take care of yourself (eat, dress, bathe, or use toilet)?  2.75 filed at 02/11/2025 1130   Can you walk indoors, such as around your house? 1.75 filed at 02/11/2025 1130   Can you walk a block or two on level ground?  2.75 filed at 02/11/2025 1130   Can you climb a flight of stairs or walk up a hill? 5.5 filed at 02/11/2025 1130   Can you run a short distance? 8 filed at 02/11/2025 1130   Can you do light work around the house like dusting or washing dishes? 2.7 filed at 02/11/2025 1130   Can you do moderate work around the house like vacuuming, sweeping floors or carrying groceries? 3.5 filed at 02/11/2025 1130   Can you do heavy work around the house like scrubbing floors or lifting and moving heavy furniture?  8 filed at 02/11/2025 1130   Can you do yard work like raking leaves, weeding or pushing a mower? 0 filed at 02/11/2025 1130   Can you have sexual relations? 5.25 filed at 02/11/2025 1130   Can you participate in moderate recreational activities like golf, bowling, dancing, doubles tennis or throwing a baseball or football? 6 filed at 02/11/2025 1130   Can you participate in strenous sports like swimming, singles tennis, football, basketball, or skiing? 0 filed at 02/11/2025 1130   DASI SCORE 46.2 filed at 02/11/2025 1130   METS Score (Will be calculated only when all the questions are answered) 8.4 filed at 02/11/2025 1130          Caprini DVT Assessment    No data to display       Modified Frailty Index    No data to display       CHADS2 Stroke Risk  Current as of 55 minutes ago        N/A 3 to 100%: High Risk   2 to < 3%: Medium Risk   0 to < 2%: Low Risk     Last Change: N/A          This score determines the patient's risk of having a stroke if the patient  has atrial fibrillation.        This score is not applicable to this patient. Components are not calculated.          Revised Cardiac Risk Index    No data to display       Apfel Simplified Score    No data to display       Risk Analysis Index Results This Encounter    No data found in the last 10 encounters.       Stop Bang Score      Flowsheet Row Pre-Admission Testing from 2/11/2025 in [facility]   Do you snore loudly? 0 filed at 02/11/2025 1129   Do you often feel tired or fatigued after your sleep? 0 filed at 02/11/2025 1129   Has anyone ever observed you stop breathing in your sleep? 0 filed at 02/11/2025 1129   Do you have or are you being treated for high blood pressure? 0 filed at 02/11/2025 1129   Recent BMI (Calculated) 26.6 filed at 02/11/2025 1129   Is BMI greater than 35 kg/m2? 0=No filed at 02/11/2025 1129   Age older than 50 years old? 0=No filed at 02/11/2025 1129   Is your neck circumference greater than 17 inches (Male) or 16 inches (Female)? 0 filed at 02/11/2025 1129   Gender - Male 0=No filed at 02/11/2025 1129   STOP-BANG Total Score 0 filed at 02/11/2025 1129          Prodigy: High Risk  Total Score: 0          ARISCAT Score for Postoperative Pulmonary Complications      Flowsheet Row Pre-Admission Testing from 2/11/2025 in [facility]   Age Calculated Score 3 filed at 02/11/2025 1328   Preoperative SpO2 0 filed at 02/11/2025 1328   Respiratory infection in the last month Either upper or lower (i.e., URI, bronchitis, pneumonia), with fever and antibiotic treatment 17 filed at 02/11/2025 1328   Preoperative anemia (Hgb less than 10 g/dl) 0 filed at 02/11/2025 1328   Surgical incision  0  [GYN] filed at 02/11/2025 1328   Duration of surgery  0 filed at 02/11/2025 1328   Emergency Procedure  0 filed at 02/11/2025 1328   ARISCAT Total Score  20 filed at 02/11/2025 1328          Loc Perioperative Risk for Myocardial Infarction or Cardiac Arrest (RASHIDA)      Flowsheet Row  Pre-Admission Testing from 2/11/2025 in [place] [facility]   Calculated Age Score 1 filed at 02/11/2025 1328   Functional Status  0 filed at 02/11/2025 1328   ASA Class  -3.29 filed at 02/11/2025 1328   Creatinine 0 filed at 02/11/2025 1328   Type of Procedure  0.76 filed at 02/11/2025 1328   RASHIDA Total Score  -6.78 filed at 02/11/2025 1328   RASHIDA % 0.11 filed at 02/11/2025 1328            Assessment and Plan:     # HLD - continue atorvastatin  # RA - suspected per PCP note; patient declined RA referral; followed by PCP  # Abnormal uterine bleeding - Hysteroscopy/D&C w/myosure w/Dr Trujillo on 2/17/2025    Medical hx, Allergies, VS and Labs reviewed  Medications addressed w/pre-op instructions provided

## 2025-02-17 ENCOUNTER — ANESTHESIA (OUTPATIENT)
Dept: OPERATING ROOM | Facility: HOSPITAL | Age: 50
End: 2025-02-17
Payer: COMMERCIAL

## 2025-02-17 ENCOUNTER — ANESTHESIA EVENT (OUTPATIENT)
Dept: OPERATING ROOM | Facility: HOSPITAL | Age: 50
End: 2025-02-17
Payer: COMMERCIAL

## 2025-02-17 ENCOUNTER — HOSPITAL ENCOUNTER (OUTPATIENT)
Facility: HOSPITAL | Age: 50
Setting detail: OUTPATIENT SURGERY
Discharge: HOME | End: 2025-02-17
Attending: OBSTETRICS & GYNECOLOGY | Admitting: OBSTETRICS & GYNECOLOGY
Payer: COMMERCIAL

## 2025-02-17 VITALS
OXYGEN SATURATION: 99 % | TEMPERATURE: 96.8 F | HEART RATE: 64 BPM | BODY MASS INDEX: 26.68 KG/M2 | DIASTOLIC BLOOD PRESSURE: 97 MMHG | SYSTOLIC BLOOD PRESSURE: 162 MMHG | WEIGHT: 145 LBS | HEIGHT: 62 IN | RESPIRATION RATE: 16 BRPM

## 2025-02-17 DIAGNOSIS — N93.9 ABNORMAL UTERINE BLEEDING: Primary | ICD-10-CM

## 2025-02-17 LAB — PREGNANCY TEST URINE, POC: NEGATIVE

## 2025-02-17 PROCEDURE — 88305 TISSUE EXAM BY PATHOLOGIST: CPT | Performed by: PATHOLOGY

## 2025-02-17 PROCEDURE — 7100000001 HC RECOVERY ROOM TIME - INITIAL BASE CHARGE: Performed by: OBSTETRICS & GYNECOLOGY

## 2025-02-17 PROCEDURE — 7100000009 HC PHASE TWO TIME - INITIAL BASE CHARGE: Performed by: OBSTETRICS & GYNECOLOGY

## 2025-02-17 PROCEDURE — 88305 TISSUE EXAM BY PATHOLOGIST: CPT | Mod: TC,PARLAB | Performed by: OBSTETRICS & GYNECOLOGY

## 2025-02-17 PROCEDURE — 2500000005 HC RX 250 GENERAL PHARMACY W/O HCPCS: Performed by: OBSTETRICS & GYNECOLOGY

## 2025-02-17 PROCEDURE — 7100000010 HC PHASE TWO TIME - EACH INCREMENTAL 1 MINUTE: Performed by: OBSTETRICS & GYNECOLOGY

## 2025-02-17 PROCEDURE — 7100000002 HC RECOVERY ROOM TIME - EACH INCREMENTAL 1 MINUTE: Performed by: OBSTETRICS & GYNECOLOGY

## 2025-02-17 PROCEDURE — 3600000008 HC OR TIME - EACH INCREMENTAL 1 MINUTE - PROCEDURE LEVEL THREE: Performed by: OBSTETRICS & GYNECOLOGY

## 2025-02-17 PROCEDURE — 3600000003 HC OR TIME - INITIAL BASE CHARGE - PROCEDURE LEVEL THREE: Performed by: OBSTETRICS & GYNECOLOGY

## 2025-02-17 PROCEDURE — 3700000002 HC GENERAL ANESTHESIA TIME - EACH INCREMENTAL 1 MINUTE: Performed by: OBSTETRICS & GYNECOLOGY

## 2025-02-17 PROCEDURE — 3700000001 HC GENERAL ANESTHESIA TIME - INITIAL BASE CHARGE: Performed by: OBSTETRICS & GYNECOLOGY

## 2025-02-17 PROCEDURE — 2720000007 HC OR 272 NO HCPCS: Performed by: OBSTETRICS & GYNECOLOGY

## 2025-02-17 PROCEDURE — 81025 URINE PREGNANCY TEST: CPT | Performed by: OBSTETRICS & GYNECOLOGY

## 2025-02-17 PROCEDURE — 2500000004 HC RX 250 GENERAL PHARMACY W/ HCPCS (ALT 636 FOR OP/ED)

## 2025-02-17 RX ORDER — SODIUM CHLORIDE 0.9 G/100ML
INJECTION, SOLUTION IRRIGATION AS NEEDED
Status: DISCONTINUED | OUTPATIENT
Start: 2025-02-17 | End: 2025-02-17 | Stop reason: HOSPADM

## 2025-02-17 RX ORDER — KETOROLAC TROMETHAMINE 30 MG/ML
INJECTION, SOLUTION INTRAMUSCULAR; INTRAVENOUS AS NEEDED
Status: DISCONTINUED | OUTPATIENT
Start: 2025-02-17 | End: 2025-02-17

## 2025-02-17 RX ORDER — ONDANSETRON HYDROCHLORIDE 2 MG/ML
INJECTION, SOLUTION INTRAVENOUS AS NEEDED
Status: DISCONTINUED | OUTPATIENT
Start: 2025-02-17 | End: 2025-02-17

## 2025-02-17 RX ORDER — SILVER NITRATE 38.21; 12.74 MG/1; MG/1
STICK TOPICAL AS NEEDED
Status: DISCONTINUED | OUTPATIENT
Start: 2025-02-17 | End: 2025-02-17 | Stop reason: HOSPADM

## 2025-02-17 RX ORDER — MIDAZOLAM HYDROCHLORIDE 1 MG/ML
INJECTION, SOLUTION INTRAMUSCULAR; INTRAVENOUS AS NEEDED
Status: DISCONTINUED | OUTPATIENT
Start: 2025-02-17 | End: 2025-02-17

## 2025-02-17 RX ORDER — PROPOFOL 10 MG/ML
INJECTION, EMULSION INTRAVENOUS AS NEEDED
Status: DISCONTINUED | OUTPATIENT
Start: 2025-02-17 | End: 2025-02-17

## 2025-02-17 RX ORDER — LIDOCAINE HCL/PF 100 MG/5ML
SYRINGE (ML) INTRAVENOUS AS NEEDED
Status: DISCONTINUED | OUTPATIENT
Start: 2025-02-17 | End: 2025-02-17

## 2025-02-17 RX ORDER — FENTANYL CITRATE 50 UG/ML
INJECTION, SOLUTION INTRAMUSCULAR; INTRAVENOUS AS NEEDED
Status: DISCONTINUED | OUTPATIENT
Start: 2025-02-17 | End: 2025-02-17

## 2025-02-17 RX ADMIN — FENTANYL CITRATE 50 MCG: 50 INJECTION, SOLUTION INTRAMUSCULAR; INTRAVENOUS at 09:58

## 2025-02-17 RX ADMIN — SODIUM CHLORIDE, POTASSIUM CHLORIDE, SODIUM LACTATE AND CALCIUM CHLORIDE: 600; 310; 30; 20 INJECTION, SOLUTION INTRAVENOUS at 09:35

## 2025-02-17 RX ADMIN — DEXAMETHASONE SODIUM PHOSPHATE 4 MG: 4 INJECTION, SOLUTION INTRAMUSCULAR; INTRAVENOUS at 09:45

## 2025-02-17 RX ADMIN — LIDOCAINE HYDROCHLORIDE 50 MG: 20 INJECTION, SOLUTION INTRAVENOUS at 09:39

## 2025-02-17 RX ADMIN — ONDANSETRON 4 MG: 2 INJECTION, SOLUTION INTRAMUSCULAR; INTRAVENOUS at 10:03

## 2025-02-17 RX ADMIN — PROPOFOL 50 MG: 10 INJECTION, EMULSION INTRAVENOUS at 09:41

## 2025-02-17 RX ADMIN — KETOROLAC TROMETHAMINE 30 MG: 30 INJECTION, SOLUTION INTRAMUSCULAR at 08:57

## 2025-02-17 RX ADMIN — PROPOFOL 150 MG: 10 INJECTION, EMULSION INTRAVENOUS at 09:39

## 2025-02-17 RX ADMIN — MIDAZOLAM 2 MG: 1 INJECTION INTRAMUSCULAR; INTRAVENOUS at 09:37

## 2025-02-17 RX ADMIN — FENTANYL CITRATE 50 MCG: 50 INJECTION, SOLUTION INTRAMUSCULAR; INTRAVENOUS at 09:48

## 2025-02-17 SDOH — HEALTH STABILITY: MENTAL HEALTH: CURRENT SMOKER: 0

## 2025-02-17 ASSESSMENT — PAIN SCALES - GENERAL
PAINLEVEL_OUTOF10: 0 - NO PAIN
PAINLEVEL_OUTOF10: 0 - NO PAIN
PAIN_LEVEL: 0
PAINLEVEL_OUTOF10: 0 - NO PAIN

## 2025-02-17 ASSESSMENT — PAIN - FUNCTIONAL ASSESSMENT
PAIN_FUNCTIONAL_ASSESSMENT: 0-10

## 2025-02-17 NOTE — ANESTHESIA PREPROCEDURE EVALUATION
"Patient: Pete Mayo \"Savannah\"    Procedure Information       Anesthesia Start Date/Time: 02/17/25 0935    Procedure: HYSTEROSCOPY/ D & C WITH MYOSURE - Hysteroscopy D&C with MyoSure    Location: [facility] / [facility]    Surgeons: Seven Trujillo MD            Relevant Problems   Anesthesia (within normal limits)      Cardiac   (+) Hyperlipidemia      ID   (+) Viral upper respiratory tract infection      GYN   (+) Abnormal uterine bleeding       Clinical information reviewed:    Allergies  Meds               NPO Detail:  NPO/Void Status  Carbohydrate Drink Given Prior to Surgery? : N  Date of Last Liquid: 02/17/25  Time of Last Liquid: 0700  Date of Last Solid: 02/16/25  Time of Last Solid: 1700  Last Intake Type: Clear fluids         Physical Exam    Airway  Mallampati: II  TM distance: >3 FB  Neck ROM: full     Cardiovascular   Rhythm: regular  Rate: normal     Dental - normal exam     Pulmonary    Abdominal        Anesthesia Plan    History of general anesthesia?: yes  History of complications of general anesthesia?: no    ASA 2     general     The patient is not a current smoker.  Patient was not previously instructed to abstain from smoking on day of procedure.  Patient did not smoke on day of procedure.    intravenous induction   Anesthetic plan and risks discussed with patient.  Use of blood products discussed with patient who.    Plan discussed with CRNA.  "

## 2025-02-17 NOTE — ANESTHESIA POSTPROCEDURE EVALUATION
"Patient: Pete Mayo \"Savannah\"    Procedure Summary       Date: 02/17/25 Room / Location: [facility] / [facility]    Anesthesia Start: 0935 Anesthesia Stop: 1018    Procedure: HYSTEROSCOPY/ D & C Diagnosis:       Abnormal uterine bleeding      (Abnormal uterine bleeding [N93.9])    Surgeons: Seven Trujillo MD Responsible Provider: Jacob Silva MD    Anesthesia Type: general ASA Status: 2            Anesthesia Type: general    Vitals Value Taken Time   /85 02/17/25 1045   Temp 36 °C (96.8 °F) 02/17/25 1015   Pulse 65 02/17/25 1047   Resp 18 02/17/25 1045   SpO2 97 % 02/17/25 1047   Vitals shown include unfiled device data.    Anesthesia Post Evaluation    Patient location during evaluation: PACU  Patient participation: complete - patient participated  Level of consciousness: awake  Pain score: 0  Pain management: adequate  Airway patency: patent  Cardiovascular status: acceptable  Respiratory status: acceptable  Hydration status: acceptable  Postoperative Nausea and Vomiting: none    No notable events documented.    "

## 2025-02-17 NOTE — ANESTHESIA PROCEDURE NOTES
Airway  Date/Time: 2/17/2025 9:42 AM  Urgency: elective    Airway not difficult    Staffing  Performed: SRNA   Authorized by: Jacob Silva MD    Performed by: Li Dasilva  Patient location during procedure: OR    Indications and Patient Condition  Indications for airway management: anesthesia and airway protection  Spontaneous ventilation: present  Sedation level: minimal  Preoxygenated: yes  Patient position: sniffing  Mask difficulty assessment: 1 - vent by mask    Final Airway Details  Final airway type: supraglottic airway      Successful airway: Supraglottic airway: iGel.  Size 4     Number of attempts at approach: 1

## 2025-02-17 NOTE — OP NOTE
"HYSTEROSCOPY/ D & C Operative Note     Date: 2025  OR Location: [facility]    Name: Pete Mayo \"Savannah\", : 1975, Age: 49 y.o., MRN: [id number], Sex: female    Diagnosis  Pre-op Diagnosis      * Abnormal uterine bleeding [N93.9] Post-op Diagnosis     * Abnormal uterine bleeding [N93.9]     Procedures  HYSTEROSCOPY/ D & C  41816 - ND HYSTEROSCOPY BX ENDOMETRIUM&/POLYPC W/WO D&C      Surgeons      * Seven Trujillo - Primary    Resident/Fellow/Other Assistant:  Surgeons and Role:  * No surgeons found with a matching role *    Staff:   Circulator: Susan  Surgical Assistant: Gianna  Circulator: Nae Mcknight Person: Kirti    Anesthesia Staff: Anesthesiologist: Jacob Silva MD  CRNA: LIYAH Cowart-ALLEN  SRNA: Li Dasilva    Procedure Summary  Anesthesia: General  ASA: II  Estimated Blood Loss: 5mL  Intra-op Medications:   Administrations occurring from 0910 to 1020 on 25:   Medication Name Total Dose   sodium chloride 0.9 % irrigation solution 4,000 mL   silver nitrate applicators applicator 1 Application   dexAMETHasone (Decadron) injection 4 mg/mL 4 mg   fentaNYL (Sublimaze) injection 50 mcg/mL 100 mcg   lidocaine (cardiac) injection 2% prefilled syringe 50 mg   midazolam (Versed) injection 1 mg/mL 2 mg   ondansetron (Zofran) 2 mg/mL injection 4 mg   propofol (Diprivan) injection 10 mg/mL 200 mg              Anesthesia Record               Intraprocedure I/O Totals       None           Specimen:   ID Type Source Tests Collected by Time   1 : ENDOMETRIAL CURETTINGS Tissue ENDOMETRIUM CURETTINGS SURGICAL PATHOLOGY EXAM Seven Trujillo MD 2025 [id number]                 Drains and/or Catheters: * None in log *    Tourniquet Times:         Implants:     Findings: Irregular endometrial cavity, thickened tissue, cavity consistent with submucous myomas.    Indications: Pete Mayo \"Savannah\" is an 49 y.o. female who is having surgery for Abnormal uterine bleeding [N93.9].     The " patient was seen in the preoperative area. The risks, benefits, complications, treatment options, non-operative alternatives, expected recovery and outcomes were discussed with the patient. The possibilities of reaction to medication, pulmonary aspiration, injury to surrounding structures, bleeding, recurrent infection, the need for additional procedures, failure to diagnose a condition, and creating a complication requiring transfusion or operation were discussed with the patient. The patient concurred with the proposed plan, giving informed consent.  The site of surgery was properly noted/marked if necessary per policy. The patient has been actively warmed in preoperative area. Preoperative antibiotics are not indicated. Venous thrombosis prophylaxis have been ordered including bilateral sequential compression devices    Procedure Details: Patient taken into the operative suite and after given adequate general LMA anesthesia was placed in the dorsolithotomy position prepped and draped in usual sterile fashion.  Speculum was placed into the vagina the anterior lip of the cervix was grasped with a single-tooth tenaculum and the cervix was dilated to a #8 size Hegar dilator.  The hysteroscope was then introduced using saline as a dilating medium.  Upon visualization of the endometrial cavity there appeared to be multiple small polyps no obvious submucous myomas.  Uterine ostia could not be visualized.  At this point the hysteroscope was removed a medium size sharp curette was used and the cavity was curetted throughout specimen was handed off to pathology.  Tenaculum was taken off the anterior lip of the cervix there was a small amount of bleeding at tenaculum site this was cauterized with silver nitrate.  No active bleeding after cauterization.  Complications:  None; patient tolerated the procedure well.    Disposition: PACU - hemodynamically stable.  Condition: stable         Task Performed by RNFA or Surgical  Assistant:  Usual functions          Additional Details: None    Attending Attestation: I performed the procedure.    Seven Trujillo  Phone Number: [phone]

## 2025-02-18 ASSESSMENT — PAIN SCALES - GENERAL: PAINLEVEL_OUTOF10: 0 - NO PAIN

## 2025-02-20 LAB
LABORATORY COMMENT REPORT: NORMAL
PATH REPORT.FINAL DX SPEC: NORMAL
PATH REPORT.GROSS SPEC: NORMAL
PATH REPORT.RELEVANT HX SPEC: NORMAL
PATH REPORT.TOTAL CANCER: NORMAL

## 2025-02-24 DIAGNOSIS — N93.9 ABNORMAL UTERINE BLEEDING (AUB): Primary | ICD-10-CM

## 2025-02-24 RX ORDER — NORETHINDRONE 5 MG/1
5 TABLET ORAL DAILY
Qty: 30 TABLET | Refills: 11 | Status: SHIPPED | OUTPATIENT
Start: 2025-02-24 | End: 2026-02-24

## 2025-02-24 NOTE — RESULT ENCOUNTER NOTE
Spoke to patient on the phone.  Reviewed normal endometrial pathology.  Discussed progesterone 5 mg daily for heavy bleeding.  Patient will follow-up for 1 month postop check.  Will proceed accordingly.

## 2025-04-10 ENCOUNTER — APPOINTMENT (OUTPATIENT)
Dept: OBSTETRICS AND GYNECOLOGY | Facility: CLINIC | Age: 50
End: 2025-04-10
Payer: COMMERCIAL

## 2025-04-10 VITALS
WEIGHT: 143.9 LBS | DIASTOLIC BLOOD PRESSURE: 85 MMHG | HEIGHT: 62 IN | BODY MASS INDEX: 26.48 KG/M2 | SYSTOLIC BLOOD PRESSURE: 129 MMHG

## 2025-04-10 DIAGNOSIS — N93.9 ABNORMAL UTERINE BLEEDING: Primary | ICD-10-CM

## 2025-04-10 PROCEDURE — 99213 OFFICE O/P EST LOW 20 MIN: CPT | Performed by: OBSTETRICS & GYNECOLOGY

## 2025-04-10 PROCEDURE — 1036F TOBACCO NON-USER: CPT | Performed by: OBSTETRICS & GYNECOLOGY

## 2025-04-10 PROCEDURE — 3008F BODY MASS INDEX DOCD: CPT | Performed by: OBSTETRICS & GYNECOLOGY

## 2025-04-10 NOTE — PROGRESS NOTES
"Subjective   Patient ID: Pete Mayo \"Octaviano" is a 49 y.o. female who presents for Post-op (Patient here for post op-hyst/d&c/Pt has no c/o/Declined chaperone).  HPI  Patient presents for postop check.    Patient is status post hysteroscopy D&C on February 17, 2025 for heavy abnormal uterine bleeding.  Normal pathology.  Patient was started on Aygestin 5 mg daily.  She said she has had no bleeding since starting the medication.  Feels well.  No issues with urination or bowel movements.  Review of Systems    Objective   Physical Exam  Abdomen is soft and nontender.  Pelvic: External genitalia normal, vagina normal rugae good tone cervix is clean uterus is small freely mobile nontender no palpable adnexal masses or tenderness.  Assessment/Plan   Normal postop check, continue Aygestin 5 mg daily.  She and I discussed regular exercise, resistance training, increasing fluids and decreasing sugar in her diet.  Follow-up for routine annual exams.  Keep a good count to record of her bleeding pattern.         Seven Trujillo MD 04/10/25 9:24 AM   "

## 2025-04-10 NOTE — PATIENT INSTRUCTIONS
Lifestyle medicine focuses on preventing, treating, and even reversing chronic diseases by making positive changes to daily habits and lifestyle. Here are key recommendations for improving health using the principles of lifestyle medicine:    1. Nutrition: Whole-Food, Plant-Based Diet   Prioritize plant-based foods: Emphasize vegetables, fruits, whole grains, legumes, nuts, and seeds. These provide essential nutrients, antioxidants, and fiber.   Limit processed foods and added sugars: Reducing ultra-processed foods helps lower the risk of obesity, diabetes, and heart disease.   Incorporate healthy fats: Choose unsaturated fats like olive oil, avocado, and nuts over trans fats and saturated fats.   Control portion sizes: Mindful eating can prevent overeating and help maintain a healthy weight.   Stay hydrated: Drink plenty of water throughout the day. Limit sugary drinks and high-calorie beverages.    2. Physical Activity: Regular Movement   Aim for at least 150 minutes per week of moderate-intensity aerobic activity (e.g., brisk walking, cycling, swimming) or 75 minutes of vigorous activity (e.g., running, HIIT). Brisk walking 1/2 hour 5 times per week.   Incorporate strength training exercises at least 2 days a week to build muscle and improve bone density.   Include flexibility and balance exercises: Yoga, Pilates, or stretching can help with flexibility and prevent injury.   Stay active throughout the day: Take regular breaks from sitting, walk whenever possible, and find ways to stay physically engaged.    3. Sleep: Prioritize Restorative Sleep   Get 7-9 hours of quality sleep per night. Adequate sleep supports immunity, mood regulation, metabolism, and cognitive function.   Establish a bedtime routine: Develop calming pre-sleep habits, such as reading, gentle stretching, or meditation.   Create a sleep-friendly environment: Keep your bedroom cool, dark, quiet, and screen-free before bed to improve sleep  quality.   Avoid stimulants before bed: Limit caffeine, alcohol, and heavy meals close to bedtime.    4. Stress Management: Mind-Body Practices   Practice mindfulness and meditation: Techniques like deep breathing, meditation, and progressive muscle relaxation can reduce stress and improve mental well-being.   Engage in regular physical activity: Exercise is a natural stress reliever.   Set boundaries: Maintain work-life balance and ensure you have time for rest and recreation.   Social connection: Strong social support reduces stress and promotes emotional resilience.   Journaling or gratitude practice: Reflecting on positive aspects of your life can help foster a more optimistic outlook.    5. Healthy Weight Management: Maintain a Balanced Weight   Focus on a balanced diet and physical activity: A healthy weight can reduce the risk of chronic diseases like heart disease, diabetes, and cancer.   Avoid extreme diets: Sustainable, balanced eating plans that focus on nutrient density are more effective long-term.   Monitor progress mindfully: Track food intake and physical activity to stay on course, but avoid obsessive behaviors around weight and food.    6. Avoid Harmful Substances: Tobacco, Alcohol, and Other Risks   Quit smoking or using tobacco products: Seek help through counseling, medications, or support groups to stop smoking.   Limit alcohol consumption: If you drink alcohol, limit it to moderate levels (up to 1 drink per day for women and 2 drinks per day for men).   Avoid recreational drugs: Substance use can lead to serious health problems, and help is available if you need assistance quitting.    7. Social Connection and Mental Health   Maintain strong social relationships: Regular interactions with friends, family, or community members promote mental well-being and reduce the risk of depression.   Seek professional help if needed: Therapy or counseling can be important for managing anxiety, depression,  or other mental health challenges.   Cultivate a positive mindset: Practicing gratitude, staying optimistic, and being kind to yourself helps mental and emotional health.    8. Preventive Care and Regular Check-ups   Schedule routine health screenings: Regular check-ups can help detect issues early. Screenings may include blood pressure checks, cholesterol tests, cancer screenings, and diabetes checks.   Vaccinations: Stay up to date with vaccinations to protect against preventable diseases.   Take prescribed medications as directed: If you have chronic conditions, manage them by following your healthcare provider’s advice and medication plan.    9. Environmental Health: Reduce Exposure to Toxins   Limit exposure to air pollution: Spend time in natural, green spaces when possible, and consider air purifiers for indoor environments.   Use non-toxic household products: Minimize the use of harsh chemicals in cleaning products, personal care items, and pesticides.   Be mindful of food choices: Organic and locally sourced foods may reduce exposure to pesticides and chemicals.    10. Purpose and Meaning: Find What Drives You   Engage in activities that bring purpose: Whether it’s work, volunteering, hobbies, or relationships, having a sense of purpose contributes to overall well-being.   Spirituality or mindfulness practices: Connecting with a higher purpose or being mindful in daily life can reduce stress and enhance life satisfaction.    Summary:  Improving health through lifestyle medicine involves making sustainable changes across key areas like diet, physical activity, sleep, stress management, and emotional well-being. A holistic approach to wellness, focusing on prevention and overall balance, can significantly reduce the risk of chronic diseases and enhance the quality of life.  For personalized recommendations, it is essential to consult healthcare professionals, such as dietitians, doctors, or lifestyle medicine  specialists, to create a plan suited to your individual needs and goals.

## 2025-06-16 ENCOUNTER — TELEPHONE (OUTPATIENT)
Dept: OBSTETRICS AND GYNECOLOGY | Facility: CLINIC | Age: 50
End: 2025-06-16
Payer: COMMERCIAL

## 2025-06-16 NOTE — TELEPHONE ENCOUNTER
Pt calling for advice about vaginal bleedding. Pt wondering about med prescribed by provider ( norethindrone) . Pt states she is experiencing vaginal bleeding after 2 months.     Hysteroscopy- 2/17/25   Post op- 4/10/25     Pt #- [phone]

## 2025-07-28 DIAGNOSIS — N93.9 ABNORMAL UTERINE BLEEDING (AUB): ICD-10-CM

## 2025-07-30 DIAGNOSIS — N93.9 ABNORMAL UTERINE BLEEDING (AUB): ICD-10-CM

## 2025-07-30 RX ORDER — NORETHINDRONE 5 MG/1
TABLET ORAL
Refills: 0 | OUTPATIENT
Start: 2025-07-30

## 2025-07-30 RX ORDER — NORETHINDRONE 5 MG/1
5 TABLET ORAL DAILY
Qty: 30 TABLET | Refills: 11 | Status: SHIPPED | OUTPATIENT
Start: 2025-07-30 | End: 2026-07-30

## (undated) DEVICE — SEAL SET, MYOSURE ROD LENS SCOPE , SINGLE USE

## (undated) DEVICE — Device

## (undated) DEVICE — SLEEVE, VASO PRESS, CALF GARMENT, MEDIUM, GREEN

## (undated) DEVICE — BASIN KIT, SINGLE